# Patient Record
Sex: FEMALE | Race: BLACK OR AFRICAN AMERICAN | Employment: FULL TIME | URBAN - METROPOLITAN AREA
[De-identification: names, ages, dates, MRNs, and addresses within clinical notes are randomized per-mention and may not be internally consistent; named-entity substitution may affect disease eponyms.]

---

## 2019-08-12 ENCOUNTER — OFFICE VISIT (OUTPATIENT)
Dept: FAMILY MEDICINE CLINIC | Age: 54
End: 2019-08-12

## 2019-08-12 VITALS
HEART RATE: 54 BPM | OXYGEN SATURATION: 98 % | BODY MASS INDEX: 29.25 KG/M2 | RESPIRATION RATE: 19 BRPM | HEIGHT: 67 IN | DIASTOLIC BLOOD PRESSURE: 88 MMHG | SYSTOLIC BLOOD PRESSURE: 138 MMHG | TEMPERATURE: 97.5 F | WEIGHT: 186.38 LBS

## 2019-08-12 DIAGNOSIS — E66.3 OVERWEIGHT (BMI 25.0-29.9): ICD-10-CM

## 2019-08-12 DIAGNOSIS — R00.1 BRADYCARDIA: ICD-10-CM

## 2019-08-12 DIAGNOSIS — J30.89 NON-SEASONAL ALLERGIC RHINITIS, UNSPECIFIED TRIGGER: ICD-10-CM

## 2019-08-12 DIAGNOSIS — I10 ESSENTIAL HYPERTENSION: Primary | ICD-10-CM

## 2019-08-12 RX ORDER — METOPROLOL SUCCINATE 25 MG/1
TABLET, EXTENDED RELEASE ORAL
COMMUNITY
End: 2019-08-12 | Stop reason: SDUPTHER

## 2019-08-12 RX ORDER — LISINOPRIL 10 MG/1
10 TABLET ORAL DAILY
Qty: 90 TAB | Refills: 1 | Status: SHIPPED | OUTPATIENT
Start: 2019-08-12 | End: 2019-12-31 | Stop reason: SDUPTHER

## 2019-08-12 RX ORDER — METOPROLOL SUCCINATE 25 MG/1
25 TABLET, EXTENDED RELEASE ORAL DAILY
Qty: 90 TAB | Refills: 1 | Status: SHIPPED | OUTPATIENT
Start: 2019-08-12 | End: 2019-12-31 | Stop reason: SDUPTHER

## 2019-08-12 RX ORDER — CHLORTHALIDONE 25 MG/1
25 TABLET ORAL DAILY
Qty: 30 TAB | Refills: 1 | Status: SHIPPED | OUTPATIENT
Start: 2019-08-12 | End: 2019-12-15 | Stop reason: SDUPTHER

## 2019-08-12 RX ORDER — FLUTICASONE PROPIONATE 50 MCG
SPRAY, SUSPENSION (ML) NASAL AS NEEDED
COMMUNITY
End: 2021-07-26 | Stop reason: ALTCHOICE

## 2019-08-12 RX ORDER — LISINOPRIL 10 MG/1
TABLET ORAL DAILY
COMMUNITY
End: 2019-08-12 | Stop reason: SDUPTHER

## 2019-08-12 NOTE — PATIENT INSTRUCTIONS
Learning About High Blood Pressure What is high blood pressure? Blood pressure is a measure of how hard the blood pushes against the walls of your arteries. It's normal for blood pressure to go up and down throughout the day, but if it stays up, you have high blood pressure. Another name for high blood pressure is hypertension. Two numbers tell you your blood pressure. The first number is the systolic pressure. It shows how hard the blood pushes when your heart is pumping. The second number is the diastolic pressure. It shows how hard the blood pushes between heartbeats, when your heart is relaxed and filling with blood. Your doctor will give you a goal for your blood pressure. Your goal will be based on your health and your age. High blood pressure (hypertension) means that the top number stays high, or the bottom number stays high, or both. High blood pressure increases the risk of stroke, heart attack, and other problems. You and your doctor will talk about your risks of these problems based on your blood pressure. What happens when you have high blood pressure? · Blood flows through your arteries with too much force. Over time, this damages the walls of your arteries. But you can't feel it. High blood pressure usually doesn't cause symptoms. · Fat and calcium start to build up in your arteries. This buildup is called plaque. Plaque makes your arteries narrower and stiffer. Blood can't flow through them as easily. · This lack of good blood flow starts to damage some of the organs in your body. This can lead to problems such as coronary artery disease and heart attack, heart failure, stroke, kidney failure, and eye damage. How can you prevent high blood pressure? · Stay at a healthy weight. · Try to limit how much sodium you eat to less than 2,300 milligrams (mg) a day. If you limit your sodium to 1,500 mg a day, you can lower your blood pressure even more. ? Buy foods that are labeled \"unsalted,\" \"sodium-free,\" or \"low-sodium. \" Foods labeled \"reduced-sodium\" and \"light sodium\" may still have too much sodium. ? Flavor your food with garlic, lemon juice, onion, vinegar, herbs, and spices instead of salt. Do not use soy sauce, steak sauce, onion salt, garlic salt, mustard, or ketchup on your food. ? Use less salt (or none) when recipes call for it. You can often use half the salt a recipe calls for without losing flavor. · Be physically active. Get at least 30 minutes of exercise on most days of the week. Walking is a good choice. You also may want to do other activities, such as running, swimming, cycling, or playing tennis or team sports. · Limit alcohol to 2 drinks a day for men and 1 drink a day for women. · Eat plenty of fruits, vegetables, and low-fat dairy products. Eat less saturated and total fats. How is high blood pressure treated? · Your doctor will suggest making lifestyle changes to help your heart. For example, your doctor may ask you to eat healthy foods, quit smoking, lose extra weight, and be more active. · If lifestyle changes don't help enough, your doctor may recommend that you take medicine. · When blood pressure is very high, medicines are needed to lower it. Follow-up care is a key part of your treatment and safety. Be sure to make and go to all appointments, and call your doctor if you are having problems. It's also a good idea to know your test results and keep a list of the medicines you take. Where can you learn more? Go to http://vianney-yani.info/. Enter P501 in the search box to learn more about \"Learning About High Blood Pressure. \" Current as of: July 22, 2018 Content Version: 12.1 © 6309-8926 Healthwise, Incorporated. Care instructions adapted under license by [employer] (which disclaims liability or warranty for this information).  If you have questions about a medical condition or this instruction, always ask your healthcare professional. [address] any warranty or liability for your use of this information. DASH Diet: Care Instructions Your Care Instructions The DASH diet is an eating plan that can help lower your blood pressure. DASH stands for Dietary Approaches to Stop Hypertension. Hypertension is high blood pressure. The DASH diet focuses on eating foods that are high in calcium, potassium, and magnesium. These nutrients can lower blood pressure. The foods that are highest in these nutrients are fruits, vegetables, low-fat dairy products, nuts, seeds, and legumes. But taking calcium, potassium, and magnesium supplements instead of eating foods that are high in those nutrients does not have the same effect. The DASH diet also includes whole grains, fish, and poultry. The DASH diet is one of several lifestyle changes your doctor may recommend to lower your high blood pressure. Your doctor may also want you to decrease the amount of sodium in your diet. Lowering sodium while following the DASH diet can lower blood pressure even further than just the DASH diet alone. Follow-up care is a key part of your treatment and safety. Be sure to make and go to all appointments, and call your doctor if you are having problems. It's also a good idea to know your test results and keep a list of the medicines you take. How can you care for yourself at home? Following the DASH diet · Eat 4 to 5 servings of fruit each day. A serving is 1 medium-sized piece of fruit, ½ cup chopped or canned fruit, 1/4 cup dried fruit, or 4 ounces (½ cup) of fruit juice. Choose fruit more often than fruit juice. · Eat 4 to 5 servings of vegetables each day. A serving is 1 cup of lettuce or raw leafy vegetables, ½ cup of chopped or cooked vegetables, or 4 ounces (½ cup) of vegetable juice. Choose vegetables more often than vegetable juice. · Get 2 to 3 servings of low-fat and fat-free dairy each day. A serving is 8 ounces of milk, 1 cup of yogurt, or 1 ½ ounces of cheese. · Eat 6 to 8 servings of grains each day. A serving is 1 slice of bread, 1 ounce of dry cereal, or ½ cup of cooked rice, pasta, or cooked cereal. Try to choose whole-grain products as much as possible. · Limit lean meat, poultry, and fish to 2 servings each day. A serving is 3 ounces, about the size of a deck of cards. · Eat 4 to 5 servings of nuts, seeds, and legumes (cooked dried beans, lentils, and split peas) each week. A serving is 1/3 cup of nuts, 2 tablespoons of seeds, or ½ cup of cooked beans or peas. · Limit fats and oils to 2 to 3 servings each day. A serving is 1 teaspoon of vegetable oil or 2 tablespoons of salad dressing. · Limit sweets and added sugars to 5 servings or less a week. A serving is 1 tablespoon jelly or jam, ½ cup sorbet, or 1 cup of lemonade. · Eat less than 2,300 milligrams (mg) of sodium a day. If you limit your sodium to 1,500 mg a day, you can lower your blood pressure even more. Tips for success · Start small. Do not try to make dramatic changes to your diet all at once. You might feel that you are missing out on your favorite foods and then be more likely to not follow the plan. Make small changes, and stick with them. Once those changes become habit, add a few more changes. · Try some of the following: ? Make it a goal to eat a fruit or vegetable at every meal and at snacks. This will make it easy to get the recommended amount of fruits and vegetables each day. ? Try yogurt topped with fruit and nuts for a snack or healthy dessert. ? Add lettuce, tomato, cucumber, and onion to sandwiches. ? Combine a ready-made pizza crust with low-fat mozzarella cheese and lots of vegetable toppings. Try using tomatoes, squash, spinach, broccoli, carrots, cauliflower, and onions. ? Have a variety of cut-up vegetables with a low-fat dip as an appetizer instead of chips and dip. ? Sprinkle sunflower seeds or chopped almonds over salads. Or try adding chopped walnuts or almonds to cooked vegetables. ? Try some vegetarian meals using beans and peas. Add garbanzo or kidney beans to salads. Make burritos and tacos with mashed lozano beans or black beans. Where can you learn more? Go to http://vianney-yani.info/. Enter F541 in the search box to learn more about \"DASH Diet: Care Instructions. \" Current as of: July 22, 2018 Content Version: 12.1 © 3068-5746 Healthwise, Incorporated. Care instructions adapted under license by [employer] (which disclaims liability or warranty for this information). If you have questions about a medical condition or this instruction, always ask your healthcare professional. [place] [address] any warranty or liability for your use of this information.

## 2019-08-12 NOTE — PROGRESS NOTES
1. Have you been to the ER, urgent care clinic since your last visit? Hospitalized since your last visit? No   2. Have you seen or consulted any other health care providers outside of the [address] [place] [address] since your last visit? Include any pap smears or colon screening. No   Chief Complaint   Patient [place]   [facility] [place]     Patient here to establish care with a PCP. Patient requesting refills on her medications.

## 2019-08-12 NOTE — PROGRESS NOTES
Subjective:     Holly Vieyra is a 46 y.o. female who presents to [facility] care and for evaluation of hypertension. Longstanding hx of hypertension, currently taking lisinopril 10 mg, toprol xl 25 mg, and hctz 25 mg  Daily. Diet and Lifestyle: generally follows a low fat low cholesterol diet, generally follows a low sodium diet, exercises sporadically, nonsmoker  Home BP Monitoring: is borderline controlled at home, ranging 130-140's/80's    Cardiovascular ROS: taking medications as instructed, no medication side effects noted, no TIA's, no chest pain on exertion, no dyspnea on exertion, no orthostatic dizziness or lightheadedness, no orthopnea or paroxysmal nocturnal dyspnea, no palpitations, no intermittent claudication symptoms. Notes occasional swelling in right ankle after long periods of driving or riding in the car. New concerns: none. There is no problem list on file for this patient. Allergies   Allergen Reactions    Sulfa (Sulfonamide Antibiotics) Shortness of Breath     Past Medical History:   Diagnosis Date    Hypertension      History reviewed. No pertinent surgical history. Family History   Problem Relation Age of Onset    Stroke Mother     Heart Failure Mother     Cancer Mother     Gout Father     Heart Attack Sister     Diabetes Sister     Hypertension Sister      Social History     Tobacco Use    Smoking status: Never Smoker    Smokeless tobacco: Never Used   Substance Use Topics    Alcohol use: Yes          Review of Systems, additional:  A comprehensive review of systems was negative except for: Neurological: positive for headaches, has noted some mild pressure behind eyes and over temples in the past week intermittently, but has been under a lot of stress due to 's new dx of diabetes.     Objective:     Visit Vitals  /88 (BP 1 Location: Left arm, BP Patient Position: Sitting)   Pulse (!) 54   Temp 97.5 °F (36.4 °C) (Oral)   Resp 19   Ht 5' 7\" (1.702 m)   Wt 186 lb 6 oz (84.5 kg)   SpO2 98%   BMI 29.19 kg/m²     Physical Exam   Constitutional: She is oriented to person, place, and time and well-developed, well-nourished, and in no distress. No distress. HENT:   Head: Normocephalic and atraumatic. Eyes: Conjunctivae are normal. No scleral icterus. Neck: Normal range of motion. Neck supple. No JVD present. No tracheal deviation present. No thyromegaly present. Cardiovascular: Normal rate, regular rhythm, normal heart sounds and intact distal pulses. Exam reveals no gallop and no friction rub. No murmur heard. Normal carotid upstrokes bilaterally without bruit. No pedal/ankle edema   Pulmonary/Chest: Effort normal and breath sounds normal. She has no wheezes. She has no rales. Abdominal: Soft. Bowel sounds are normal. She exhibits no distension and no mass. There is no tenderness. Musculoskeletal: She exhibits no tenderness or deformity. Lymphadenopathy:     She has no cervical adenopathy. Neurological: She is alert and oriented to person, place, and time. She exhibits normal muscle tone. Gait normal. Coordination normal.   Skin: Skin is warm and dry. No rash noted. She is not diaphoretic. Psychiatric: Mood, memory, affect and judgment normal.         Assessment/Plan:       reviewed diet, exercise and weight control  copy of written low fat low cholesterol diet provided and reviewed  cardiovascular risk and specific lipid/LDL goals reviewed  reviewed medications and side effects in detail  reviewed potential future medication changes and side effects. Diagnoses and all orders for this visit:    1. Essential hypertension  Near goal  Continue toprol, prinivil  Discontinue hctz  Add chlorthalidone  DASH diet  Weight loss  150 minutes of moderate intensity exercise weekly  -     lisinopril (PRINIVIL, ZESTRIL) 10 mg tablet; Take 1 Tab by mouth daily. -     metoprolol succinate (TOPROL-XL) 25 mg XL tablet; Take 1 Tab by mouth daily.   - chlorthalidone (HYGROTEN) 25 mg tablet; Take 1 Tab by mouth daily.  -     LIPID PANEL  -     METABOLIC PANEL, COMPREHENSIVE  -     TSH 3RD GENERATION  -     CBC W/O DIFF    2. Bradycardia  Due to beta blocker, increase in dose not recommended    3. Overweight (BMI 25.0-29. 9)  I have reviewed/discussed the above normal BMI with the patient. I have recommended the following interventions: dietary management education, guidance, and counseling, encourage exercise and monitor weight . .      4. Non-seasonal allergic rhinitis, unspecified trigger  Stable  Continue flonase    Follow-up and Dispositions    · Return in about 1 month (around 9/9/2019) for f/u BP. I have discussed the diagnosis with the patient and the intended plan as seen in the above orders. The patient has received an after-visit summary and questions were answered concerning future plans. Patient conveyed understanding of the plan at the time of the visit.     Ashley Goldman NP  08/12/19

## 2019-08-13 LAB
ALBUMIN SERPL-MCNC: 4.2 G/DL (ref 3.5–5.5)
ALBUMIN/GLOB SERPL: 1.7 {RATIO} (ref 1.2–2.2)
ALP SERPL-CCNC: 51 IU/L (ref 39–117)
ALT SERPL-CCNC: 14 IU/L (ref 0–32)
AST SERPL-CCNC: 20 IU/L (ref 0–40)
BILIRUB SERPL-MCNC: 0.5 MG/DL (ref 0–1.2)
BUN SERPL-MCNC: 13 MG/DL (ref 6–24)
BUN/CREAT SERPL: 18 (ref 9–23)
CALCIUM SERPL-MCNC: 9.2 MG/DL (ref 8.7–10.2)
CHLORIDE SERPL-SCNC: 104 MMOL/L (ref 96–106)
CHOLEST SERPL-MCNC: 173 MG/DL (ref 100–199)
CO2 SERPL-SCNC: 27 MMOL/L (ref 20–29)
CREAT SERPL-MCNC: 0.73 MG/DL (ref 0.57–1)
ERYTHROCYTE [DISTWIDTH] IN BLOOD BY AUTOMATED COUNT: 14.2 % (ref 12.3–15.4)
GLOBULIN SER CALC-MCNC: 2.5 G/DL (ref 1.5–4.5)
GLUCOSE SERPL-MCNC: 89 MG/DL (ref 65–99)
HCT VFR BLD AUTO: 39.7 % (ref 34–46.6)
HDLC SERPL-MCNC: 55 MG/DL
HGB BLD-MCNC: 12.7 G/DL (ref 11.1–15.9)
INTERPRETATION, 910389: NORMAL
LDLC SERPL CALC-MCNC: 102 MG/DL (ref 0–99)
MCH RBC QN AUTO: 27.7 PG (ref 26.6–33)
MCHC RBC AUTO-ENTMCNC: 32 G/DL (ref 31.5–35.7)
MCV RBC AUTO: 87 FL (ref 79–97)
PLATELET # BLD AUTO: 193 X10E3/UL (ref 150–450)
POTASSIUM SERPL-SCNC: 4 MMOL/L (ref 3.5–5.2)
PROT SERPL-MCNC: 6.7 G/DL (ref 6–8.5)
RBC # BLD AUTO: 4.59 X10E6/UL (ref 3.77–5.28)
SODIUM SERPL-SCNC: 144 MMOL/L (ref 134–144)
TRIGL SERPL-MCNC: 79 MG/DL (ref 0–149)
TSH SERPL DL<=0.005 MIU/L-ACNC: 0.71 UIU/ML (ref 0.45–4.5)
VLDLC SERPL CALC-MCNC: 16 MG/DL (ref 5–40)
WBC # BLD AUTO: 5.2 X10E3/UL (ref 3.4–10.8)

## 2019-08-19 NOTE — PROGRESS NOTES
LDL just above goal- recommend cutting back calories from saturated fat in the diet. All other labs are normal.  Repeat in 12 months.

## 2019-09-13 ENCOUNTER — OFFICE VISIT (OUTPATIENT)
Dept: FAMILY MEDICINE CLINIC | Age: 54
End: 2019-09-13

## 2019-09-13 VITALS
TEMPERATURE: 97.5 F | HEIGHT: 67 IN | SYSTOLIC BLOOD PRESSURE: 105 MMHG | BODY MASS INDEX: 29.1 KG/M2 | DIASTOLIC BLOOD PRESSURE: 72 MMHG | HEART RATE: 66 BPM | RESPIRATION RATE: 14 BRPM | OXYGEN SATURATION: 97 % | WEIGHT: 185.4 LBS

## 2019-09-13 DIAGNOSIS — I10 ESSENTIAL HYPERTENSION: Primary | ICD-10-CM

## 2019-09-13 DIAGNOSIS — J30.89 NON-SEASONAL ALLERGIC RHINITIS, UNSPECIFIED TRIGGER: ICD-10-CM

## 2019-09-13 DIAGNOSIS — Z12.39 SCREENING FOR MALIGNANT NEOPLASM OF BREAST: ICD-10-CM

## 2019-09-13 NOTE — PROGRESS NOTES
Jose Jeffrey is a 46 y.o. female    Chief Complaint   Patient presents with    Hypertension    Follow-up     1. Have you been to the ER, urgent care clinic since your last visit? Hospitalized since your last visit? No    2. Have you seen or consulted any other health care providers outside of the [address] [place] [address] since your last visit? Include any pap smears or colon screening.  No

## 2019-09-13 NOTE — PATIENT INSTRUCTIONS
Hold the diuretic (chlorthalidone) for now, continue your other meds. Take your blood pressure every day, record on log, send measurements to me via [employer]. Once we see the effect not taking the chlorthalidone has on your blood pressure, we can make a good decision about what to do to keep your blood pressure controlled. High Blood Pressure: Care Instructions  Overview    It's normal for blood pressure to go up and down throughout the day. But if it stays up, you have high blood pressure. Another name for high blood pressure is hypertension. Despite what a lot of people think, high blood pressure usually doesn't cause headaches or make you feel dizzy or lightheaded. It usually has no symptoms. But it does increase your risk of stroke, heart attack, and other problems. You and your doctor will talk about your risks of these problems based on your blood pressure. Your doctor will give you a goal for your blood pressure. Your goal will be based on your health and your age. Lifestyle changes, such as eating healthy and being active, are always important to help lower blood pressure. You might also take medicine to reach your blood pressure goal.  Follow-up care is a key part of your treatment and safety. Be sure to make and go to all appointments, and call your doctor if you are having problems. It's also a good idea to know your test results and keep a list of the medicines you take. How can you care for yourself at home? Medical treatment  · If you stop taking your medicine, your blood pressure will go back up. You may take one or more types of medicine to lower your blood pressure. Be safe with medicines. Take your medicine exactly as prescribed. Call your doctor if you think you are having a problem with your medicine. · Talk to your doctor before you start taking aspirin every day. Aspirin can help certain people lower their risk of a heart attack or stroke.  But taking aspirin isn't right for everyone, because it can cause serious bleeding. · See your doctor regularly. You may need to see the doctor more often at first or until your blood pressure comes down. · If you are taking blood pressure medicine, talk to your doctor before you take decongestants or anti-inflammatory medicine, such as ibuprofen. Some of these medicines can raise blood pressure. · Learn how to check your blood pressure at home. Lifestyle changes  · Stay at a healthy weight. This is especially important if you put on weight around the waist. Losing even 10 pounds can help you lower your blood pressure. · If your doctor recommends it, get more exercise. Walking is a good choice. Bit by bit, increase the amount you walk every day. Try for at least 30 minutes on most days of the week. You also may want to swim, bike, or do other activities. · Avoid or limit alcohol. Talk to your doctor about whether you can drink any alcohol. · Try to limit how much sodium you eat to less than 2,300 milligrams (mg) a day. Your doctor may ask you to try to eat less than 1,500 mg a day. · Eat plenty of fruits (such as bananas and oranges), vegetables, legumes, whole grains, and low-fat dairy products. · Lower the amount of saturated fat in your diet. Saturated fat is found in animal products such as milk, cheese, and meat. Limiting these foods may help you lose weight and also lower your risk for heart disease. · Do not smoke. Smoking increases your risk for heart attack and stroke. If you need help quitting, talk to your doctor about stop-smoking programs and medicines. These can increase your chances of quitting for good. When should you call for help? Call 911 anytime you think you may need emergency care. This may mean having symptoms that suggest that your blood pressure is causing a serious heart or blood vessel problem. Your blood pressure may be over 180/120.   For example, call 911 if:    · You have symptoms of a heart attack.  These may include:  ? Chest pain or pressure, or a strange feeling in the chest.  ? Sweating. ? Shortness of breath. ? Nausea or vomiting. ? Pain, pressure, or a strange feeling in the back, neck, jaw, or upper belly or in one or both shoulders or arms. ? Lightheadedness or sudden weakness. ? A fast or irregular heartbeat.     · You have symptoms of a stroke. These may include:  ? Sudden numbness, tingling, weakness, or loss of movement in your face, arm, or leg, especially on only one side of your body. ? Sudden vision changes. ? Sudden trouble speaking. ? Sudden confusion or trouble understanding simple statements. ? Sudden problems with walking or balance. ? A sudden, severe headache that is different from past headaches.     · You have severe back or belly pain.    Do not wait until your blood pressure comes down on its own. Get help right away.   Call your doctor now or seek immediate care if:    · Your blood pressure is much higher than normal (such as 180/120 or higher), but you don't have symptoms.     · You think high blood pressure is causing symptoms, such as:  ? Severe headache.  ? Blurry vision.    Watch closely for changes in your health, and be sure to contact your doctor if:    · Your blood pressure measures higher than your doctor recommends at least 2 times. That means the top number is higher or the bottom number is higher, or both.     · You think you may be having side effects from your blood pressure medicine. Where can you learn more? Go to http://vianney-yani.info/. Enter Z279 in the search box to learn more about \"High Blood Pressure: Care Instructions. \"  Current as of: July 22, 2018  Content Version: 12.1  © 3819-4392 [employer]. Care instructions adapted under license by [employer] (which disclaims liability or warranty for this information).  If you have questions about a medical condition or this instruction, always ask your healthcare professional. [address] any warranty or liability for your use of this information.

## 2019-10-14 NOTE — PROGRESS NOTES
Subjective:     Nicole Pike is a 53 y.o. female who presents for follow up of hypertension and allergic rhinitis. Diet and Lifestyle: generally follows a low fat low cholesterol diet, generally follows a low sodium diet, exercises sporadically, nonsmoker  Home BP Monitoring: is not measured at home    Cardiovascular ROS: taking medications as instructed, no medication side effects noted, no TIA's, no chest pain on exertion, no dyspnea on exertion, no swelling of ankles, no orthostatic dizziness or lightheadedness, no orthopnea or paroxysmal nocturnal dyspnea, no palpitations, no intermittent claudication symptoms. New concerns: c/o nasal congestion, clear rhinorrhea, dry cough x 1-2 weeks. No fever or chills. No sputum production. Patient Active Problem List   Diagnosis Code    Essential hypertension I10    Non-seasonal allergic rhinitis J30.89    Overweight (BMI 25.0-29. 9) E66.3     Allergies   Allergen Reactions    Sulfa (Sulfonamide Antibiotics) Shortness of Breath     Past Medical History:   Diagnosis Date    Hypertension      History reviewed. No pertinent surgical history.   Family History   Problem Relation Age of Onset    Stroke Mother     Heart Failure Mother     Cancer Mother     Gout Father     Heart Attack Sister     Diabetes Sister     Hypertension Sister      Social History     Tobacco Use    Smoking status: Never Smoker    Smokeless tobacco: Never Used   Substance Use Topics    Alcohol use: Yes        Lab Results   Component Value Date/Time    WBC 5.2 08/12/2019 09:32 AM    HGB 12.7 08/12/2019 09:32 AM    HCT 39.7 08/12/2019 09:32 AM    PLATELET 806 30/32/5629 09:32 AM    MCV 87 08/12/2019 09:32 AM     Lab Results   Component Value Date/Time    Glucose 89 08/12/2019 09:32 AM    LDL, calculated 102 (H) 08/12/2019 09:32 AM    Creatinine 0.73 08/12/2019 09:32 AM      Lab Results   Component Value Date/Time    Cholesterol, total 173 08/12/2019 09:32 AM    HDL Cholesterol 55 08/12/2019 09:32 AM    LDL, calculated 102 (H) 08/12/2019 09:32 AM    Triglyceride 79 08/12/2019 09:32 AM     Lab Results   Component Value Date/Time    ALT (SGPT) 14 08/12/2019 09:32 AM    AST (SGOT) 20 08/12/2019 09:32 AM    Alk. phosphatase 51 08/12/2019 09:32 AM    Bilirubin, total 0.5 08/12/2019 09:32 AM    Albumin 4.2 08/12/2019 09:32 AM    Protein, total 6.7 08/12/2019 09:32 AM    PLATELET 386 07/18/5718 09:32 AM     Lab Results   Component Value Date/Time    GFR est non-AA 94 08/12/2019 09:32 AM    GFR est  08/12/2019 09:32 AM    Creatinine 0.73 08/12/2019 09:32 AM    BUN 13 08/12/2019 09:32 AM    Sodium 144 08/12/2019 09:32 AM    Potassium 4.0 08/12/2019 09:32 AM    Chloride 104 08/12/2019 09:32 AM    CO2 27 08/12/2019 09:32 AM     Lab Results   Component Value Date/Time    TSH 0.713 08/12/2019 09:32 AM         Review of Systems, additional:  A comprehensive review of systems was negative except for that written in the HPI. Objective:     Visit Vitals  /72 (BP 1 Location: Right arm, BP Patient Position: Sitting)   Pulse 66   Temp 97.5 °F (36.4 °C) (Oral)   Resp 14   Ht 5' 7\" (1.702 m)   Wt 185 lb 6.4 oz (84.1 kg)   SpO2 97%   BMI 29.04 kg/m²     Physical Exam   Constitutional: She is oriented to person, place, and time and well-developed, well-nourished, and in no distress. No distress. HENT:   Head: Normocephalic and atraumatic. Right Ear: Hearing, tympanic membrane, external ear and ear canal normal.   Left Ear: Hearing, tympanic membrane, external ear and ear canal normal.   Nose: Nose normal.   Mouth/Throat: Oropharynx is clear and moist.   Eyes: Conjunctivae are normal. Right eye exhibits no discharge. Left eye exhibits no discharge. No scleral icterus. Neck: Normal range of motion. Neck supple. No JVD present. No tracheal deviation present. No thyromegaly present. Cardiovascular: Normal rate, regular rhythm, normal heart sounds and intact distal pulses.  Exam reveals no gallop and no friction rub. No murmur heard. Normal carotid upstrokes without bruit bilaterally. No pedal/ankle edema. Pulmonary/Chest: Effort normal and breath sounds normal. She has no wheezes. She has no rales. Abdominal: Soft. Bowel sounds are normal. She exhibits no distension and no mass. There is no tenderness. Lymphadenopathy:     She has no cervical adenopathy. Neurological: She is alert and oriented to person, place, and time. She exhibits normal muscle tone. Gait normal. Coordination normal.   Skin: Skin is warm and dry. No rash noted. She is not diaphoretic. Psychiatric: Mood, memory, affect and judgment normal.         Assessment/Plan:     reviewed diet, exercise and weight control  copy of written low fat low cholesterol diet provided and reviewed  cardiovascular risk and specific lipid/LDL goals reviewed  reviewed medications and side effects in detail  reviewed potential future medication changes and side effects. Diagnoses and all orders for this visit:    1. Essential hypertension  At goal  Continue rx  DASH diet  Weight loss  Daily walking or other exercise  -     [facility] BP FLOWSHEET    2. Non-seasonal allergic rhinitis, unspecified trigger  restart flonase  Add antihistamine if symptoms not adequately controlled  Avoid decongestants due to HTN    3. Screening for malignant neoplasm of breast  -     [facility] BI SCREENING INCL CAD; Future      Follow-up and Dispositions    · Return in about 3 months (around 12/13/2019), or if symptoms worsen or fail to improve. I have discussed the diagnosis with the patient and the intended plan as seen in the above orders. The patient has received an after-visit summary and questions were answered concerning future plans. Patient conveyed understanding of the plan at the time of the visit.     Bipin King NP

## 2019-11-05 ENCOUNTER — HOSPITAL ENCOUNTER (OUTPATIENT)
Dept: MAMMOGRAPHY | Age: 54
Discharge: HOME | End: 2019-11-05
Attending: NURSE PRACTITIONER
Payer: COMMERCIAL

## 2019-11-05 DIAGNOSIS — Z12.39 SCREENING FOR MALIGNANT NEOPLASM OF BREAST: ICD-10-CM

## 2019-11-05 PROCEDURE — 77067 SCR MAMMO BI INCL CAD: CPT

## 2019-12-04 ENCOUNTER — TELEPHONE (OUTPATIENT)
Dept: FAMILY MEDICINE CLINIC | Age: 54
End: 2019-12-04

## 2019-12-04 NOTE — TELEPHONE ENCOUNTER
----- Message from Jennifer Rodriguez NP sent at 12/4/2019 10:24 AM EST -----  Negative mammogram, next screening recommended in 1 year.

## 2019-12-05 ENCOUNTER — TELEPHONE (OUTPATIENT)
Dept: FAMILY MEDICINE CLINIC | Age: 54
End: 2019-12-05

## 2019-12-05 NOTE — TELEPHONE ENCOUNTER
2nd attempted made to pt to inform results. Patient x2 id verified. Informed results to pt, pt verbalized understanding.

## 2019-12-05 NOTE — TELEPHONE ENCOUNTER
----- Message from Inderjit Cordero NP sent at 12/4/2019 10:24 AM EST -----  Negative mammogram, next screening recommended in 1 year.

## 2019-12-31 ENCOUNTER — OFFICE VISIT (OUTPATIENT)
Dept: FAMILY MEDICINE CLINIC | Age: 54
End: 2019-12-31

## 2019-12-31 VITALS
BODY MASS INDEX: 29.43 KG/M2 | OXYGEN SATURATION: 98 % | SYSTOLIC BLOOD PRESSURE: 127 MMHG | WEIGHT: 187.5 LBS | DIASTOLIC BLOOD PRESSURE: 84 MMHG | RESPIRATION RATE: 16 BRPM | HEART RATE: 73 BPM | HEIGHT: 67 IN | TEMPERATURE: 98.1 F

## 2019-12-31 DIAGNOSIS — K52.9 ACUTE GASTROENTERITIS: ICD-10-CM

## 2019-12-31 DIAGNOSIS — I10 ESSENTIAL HYPERTENSION: Primary | ICD-10-CM

## 2019-12-31 RX ORDER — METOPROLOL SUCCINATE 25 MG/1
25 TABLET, EXTENDED RELEASE ORAL DAILY
Qty: 90 TAB | Refills: 1 | Status: SHIPPED | OUTPATIENT
Start: 2019-12-31 | End: 2020-06-16 | Stop reason: SDUPTHER

## 2019-12-31 RX ORDER — LISINOPRIL 10 MG/1
10 TABLET ORAL DAILY
Qty: 90 TAB | Refills: 1 | Status: SHIPPED | OUTPATIENT
Start: 2019-12-31 | End: 2020-06-16 | Stop reason: SDUPTHER

## 2019-12-31 RX ORDER — CHLORTHALIDONE 25 MG/1
25 TABLET ORAL DAILY
Qty: 90 TAB | Refills: 1 | Status: SHIPPED | OUTPATIENT
Start: 2019-12-31 | End: 2020-06-16 | Stop reason: SDUPTHER

## 2019-12-31 NOTE — PATIENT INSTRUCTIONS
Gastroenteritis: Care Instructions  Your Care Instructions    Gastroenteritis is an illness that may cause nausea, vomiting, and diarrhea. It is sometimes called \"stomach flu. \" It can be caused by bacteria or a virus. You will probably begin to feel better in 1 to 2 days. In the meantime, get plenty of rest and make sure you do not become dehydrated. Dehydration occurs when your body loses too much fluid. Follow-up care is a key part of your treatment and safety. Be sure to make and go to all appointments, and call your doctor if you are having problems. It's also a good idea to know your test results and keep a list of the medicines you take. How can you care for yourself at home? · If your doctor prescribed antibiotics, take them as directed. Do not stop taking them just because you feel better. You need to take the full course of antibiotics. · Drink plenty of fluids to prevent dehydration, enough so that your urine is light yellow or clear like water. Choose water and other caffeine-free clear liquids until you feel better. If you have kidney, heart, or liver disease and have to limit fluids, talk with your doctor before you increase your fluid intake. · Drink fluids slowly, in frequent, small amounts, because drinking too much too fast can cause vomiting. · Begin eating mild foods, such as dry toast, yogurt, applesauce, bananas, and rice. Avoid spicy, hot, or high-fat foods, and do not drink alcohol or caffeine for a day or two. Do not drink milk or eat ice cream until you are feeling better. How to prevent gastroenteritis  · Keep hot foods hot and cold foods cold. · Do not eat meats, dressings, salads, or other foods that have been kept at room temperature for more than 2 hours. · Use a thermometer to check your refrigerator. It should be between 34°F and 40°F.  · Defrost meats in the refrigerator or microwave, not on the kitchen counter. · Keep your hands and your kitchen clean.  Wash your hands, cutting boards, and countertops with hot soapy water frequently. · Cook meat until it is well done. · Do not eat raw eggs or uncooked sauces made with raw eggs. · Do not take chances. If food looks or tastes spoiled, throw it out. When should you call for help? Call 911 anytime you think you may need emergency care. For example, call if:    · You vomit blood or what looks like coffee grounds.     · You passed out (lost consciousness).     · You pass maroon or very bloody stools.    Call your doctor now or seek immediate medical care if:    · You have severe belly pain.     · You have signs of needing more fluids. You have sunken eyes, a dry mouth, and pass only a little dark urine.     · You feel like you are going to faint.     · You have increased belly pain that does not go away in 1 to 2 days.     · You have new or increased nausea, or you are vomiting.     · You have a new or higher fever.     · Your stools are black and tarlike or have streaks of blood.    Watch closely for changes in your health, and be sure to contact your doctor if:    · You are dizzy or lightheaded.     · You urinate less than usual, or your urine is dark yellow or brown.     · You do not feel better with each day that goes by. Where can you learn more? Go to http://vianney-yani.info/. Enter N142 in the search box to learn more about \"Gastroenteritis: Care Instructions. \"  Current as of: June 9, 2019  Content Version: 12.2  © 6721-6804 [email or web address]. Care instructions adapted under license by [employer] (which disclaims liability or warranty for this information). If you have questions about a medical condition or this instruction, always ask your healthcare professional. [place] [address] any warranty or liability for your use of this information.          Oral Rehydration: Care Instructions  Your Care Instructions    Dehydration occurs when your body loses too much water. This can happen if you do not drink enough fluids or lose a lot of fluid due to diarrhea, vomiting, or sweating. Being dehydrated can cause health problems and can even be life-threatening. To replace lost fluids, you need to drink liquid that contains special chemicals called electrolytes. Electrolytes keep your body working well. Plain water does not have electrolytes. You also need to rest to prevent more fluid loss. Replacing water and electrolytes (oral rehydration) completely takes about 36 hours. But you should feel better within a few hours. Follow-up care is a key part of your treatment and safety. Be sure to make and go to all appointments, and call your doctor if you are having problems. It's also a good idea to know your test results and keep a list of the medicines you take. How can you care for yourself at home? · Take frequent sips of a drink such as Gatorade, Powerade, or other rehydration drinks that your doctor suggests. These replace both fluid and important chemicals (electrolytes) you need for balance in your blood. · Drink 2 quarts of cool liquid over 2 to 4 hours. You should have at least 10 glasses of liquid a day to replace lost fluid. If you have kidney, heart, or liver disease and have to limit fluids, talk with your doctor before you increase the amount of fluids you drink. · Make your own drink. Measure everything carefully. The drink may not work well or may even be harmful if the amounts are off. ? 1 quart water  ? ½ teaspoon salt  ? 6 teaspoons sugar  · Do not drink liquid with caffeine, such as coffee and ellis. · Do not drink any alcohol. It can make you dehydrated. · Drink plenty of fluids, enough so that your urine is light yellow or clear like water. If you have kidney, heart, or liver disease and have to limit fluids, talk with your doctor before you increase the amount of fluids you drink. When should you call for help?   Call 911 anytime you think you may need emergency care. For example, call if:    · You have signs of severe dehydration, such as:  ? You are confused or unable to stay awake.  ? You passed out (lost consciousness).    Call your doctor now or seek immediate medical care if:    · You still have signs of dehydration. You have sunken eyes and a dry mouth, and you pass only a little dark urine.     · You are dizzy or lightheaded, or you feel like you may faint.     · You are not able to keep down fluids.    Watch closely for changes in your health, and be sure to contact your doctor if:    · You do not get better as expected. Where can you learn more? Go to http://vianney-yani.info/. Enter I040 in the search box to learn more about \"Oral Rehydration: Care Instructions. \"  Current as of: June 26, 2019  Content Version: 12.2  © 7476-5468 [employer]. Care instructions adapted under license by [employer] (which disclaims liability or warranty for this information). If you have questions about a medical condition or this instruction, always ask your healthcare professional. [address] any warranty or liability for your use of this information. High Blood Pressure: Care Instructions  Overview    It's normal for blood pressure to go up and down throughout the day. But if it stays up, you have high blood pressure. Another name for high blood pressure is hypertension. Despite what a lot of people think, high blood pressure usually doesn't cause headaches or make you feel dizzy or lightheaded. It usually has no symptoms. But it does increase your risk of stroke, heart attack, and other problems. You and your doctor will talk about your risks of these problems based on your blood pressure. Your doctor will give you a goal for your blood pressure. Your goal will be based on your health and your age.   Lifestyle changes, such as eating healthy and being active, are always important to help lower blood pressure. You might also take medicine to reach your blood pressure goal.  Follow-up care is a key part of your treatment and safety. Be sure to make and go to all appointments, and call your doctor if you are having problems. It's also a good idea to know your test results and keep a list of the medicines you take. How can you care for yourself at home? Medical treatment  · If you stop taking your medicine, your blood pressure will go back up. You may take one or more types of medicine to lower your blood pressure. Be safe with medicines. Take your medicine exactly as prescribed. Call your doctor if you think you are having a problem with your medicine. · Talk to your doctor before you start taking aspirin every day. Aspirin can help certain people lower their risk of a heart attack or stroke. But taking aspirin isn't right for everyone, because it can cause serious bleeding. · See your doctor regularly. You may need to see the doctor more often at first or until your blood pressure comes down. · If you are taking blood pressure medicine, talk to your doctor before you take decongestants or anti-inflammatory medicine, such as ibuprofen. Some of these medicines can raise blood pressure. · Learn how to check your blood pressure at home. Lifestyle changes  · Stay at a healthy weight. This is especially important if you put on weight around the waist. Losing even 10 pounds can help you lower your blood pressure. · If your doctor recommends it, get more exercise. Walking is a good choice. Bit by bit, increase the amount you walk every day. Try for at least 30 minutes on most days of the week. You also may want to swim, bike, or do other activities. · Avoid or limit alcohol. Talk to your doctor about whether you can drink any alcohol. · Try to limit how much sodium you eat to less than 2,300 milligrams (mg) a day. Your doctor may ask you to try to eat less than 1,500 mg a day.   · Eat plenty of fruits (such as bananas and oranges), vegetables, legumes, whole grains, and low-fat dairy products. · Lower the amount of saturated fat in your diet. Saturated fat is found in animal products such as milk, cheese, and meat. Limiting these foods may help you lose weight and also lower your risk for heart disease. · Do not smoke. Smoking increases your risk for heart attack and stroke. If you need help quitting, talk to your doctor about stop-smoking programs and medicines. These can increase your chances of quitting for good. When should you call for help? Call  911 anytime you think you may need emergency care. This may mean having symptoms that suggest that your blood pressure is causing a serious heart or blood vessel problem. Your blood pressure may be over 180/120.   For example, call  911 if:    · You have symptoms of a heart attack. These may include:  ? Chest pain or pressure, or a strange feeling in the chest.  ? Sweating. ? Shortness of breath. ? Nausea or vomiting. ? Pain, pressure, or a strange feeling in the back, neck, jaw, or upper belly or in one or both shoulders or arms. ? Lightheadedness or sudden weakness. ? A fast or irregular heartbeat.     · You have symptoms of a stroke. These may include:  ? Sudden numbness, tingling, weakness, or loss of movement in your face, arm, or leg, especially on only one side of your body. ? Sudden vision changes. ? Sudden trouble speaking. ? Sudden confusion or trouble understanding simple statements. ? Sudden problems with walking or balance. ? A sudden, severe headache that is different from past headaches.     · You have severe back or belly pain.    Do not wait until your blood pressure comes down on its own.  Get help right away.   Call your doctor now or seek immediate care if:    · Your blood pressure is much higher than normal (such as 180/120 or higher), but you don't have symptoms.     · You think high blood pressure is causing symptoms, such as:  ? Severe headache.  ? Blurry vision.    Watch closely for changes in your health, and be sure to contact your doctor if:    · Your blood pressure measures higher than your doctor recommends at least 2 times. That means the top number is higher or the bottom number is higher, or both.     · You think you may be having side effects from your blood pressure medicine. Where can you learn more? Go to http://vianney-yani.info/. Enter O208 in the search box to learn more about \"High Blood Pressure: Care Instructions. \"  Current as of: April 9, 2019  Content Version: 12.2  © 7124-4588 [employer]. Care instructions adapted under license by [employer] (which disclaims liability or warranty for this information). If you have questions about a medical condition or this instruction, always ask your healthcare professional. [address] any warranty or liability for your use of this information.

## 2019-12-31 NOTE — PROGRESS NOTES
Subjective:     Nicole Henry is a 53 y.o. female who presents for follow up of hypertension and evaluation of vomiting, diarrhea, and decreased appetite. Diet and Lifestyle: generally follows a low fat low cholesterol diet, generally follows a low sodium diet, sedentary, nonsmoker  Home BP Monitoring: has been variable at home, high before alana but low 2 days ago    Cardiovascular ROS: taking medications as instructed, no medication side effects noted, no TIA's, no chest pain on exertion, no dyspnea on exertion, no swelling of ankles, no orthostatic dizziness or lightheadedness, no orthopnea or paroxysmal nocturnal dyspnea, no palpitations, no intermittent claudication symptoms. New concerns: c/o decreased appetite for the past few days, began after acute GI illness over the weekend with diarrhea, nausea and vomiting. Diarrhea and vomiting now resolved, keeping fluids down but has no desire to eat. Patient Active Problem List   Diagnosis Code    Essential hypertension I10    Non-seasonal allergic rhinitis J30.89    Overweight (BMI 25.0-29. 9) E66.3     Allergies   Allergen Reactions    Sulfa (Sulfonamide Antibiotics) Shortness of Breath     Past Medical History:   Diagnosis Date    Hypertension      Past Surgical History:   Procedure Laterality Date    HX BREAST BIOPSY Left     benign    HX BREAST LUMPECTOMY Left     benign     Family History   Problem Relation Age of Onset    Stroke Mother     Heart Failure Mother     Cancer Mother     Gout Father     Heart Attack Sister     Diabetes Sister     Hypertension Sister     Breast Cancer Paternal Aunt      Social History     Tobacco Use    Smoking status: Never Smoker    Smokeless tobacco: Never Used   Substance Use Topics    Alcohol use: Yes        Lab Results   Component Value Date/Time    WBC 5.2 08/12/2019 09:32 AM    HGB 12.7 08/12/2019 09:32 AM    HCT 39.7 08/12/2019 09:32 AM    PLATELET 372 11/78/2348 09:32 AM    MCV 87 08/12/2019 09:32 AM     Lab Results   Component Value Date/Time    Glucose 89 08/12/2019 09:32 AM    LDL, calculated 102 (H) 08/12/2019 09:32 AM    Creatinine 0.73 08/12/2019 09:32 AM      Lab Results   Component Value Date/Time    Cholesterol, total 173 08/12/2019 09:32 AM    HDL Cholesterol 55 08/12/2019 09:32 AM    LDL, calculated 102 (H) 08/12/2019 09:32 AM    Triglyceride 79 08/12/2019 09:32 AM     Lab Results   Component Value Date/Time    ALT (SGPT) 14 08/12/2019 09:32 AM    AST (SGOT) 20 08/12/2019 09:32 AM    Alk. phosphatase 51 08/12/2019 09:32 AM    Bilirubin, total 0.5 08/12/2019 09:32 AM    Albumin 4.2 08/12/2019 09:32 AM    Protein, total 6.7 08/12/2019 09:32 AM    PLATELET 599 41/67/3393 09:32 AM     Lab Results   Component Value Date/Time    GFR est non-AA 94 08/12/2019 09:32 AM    GFR est  08/12/2019 09:32 AM    Creatinine 0.73 08/12/2019 09:32 AM    BUN 13 08/12/2019 09:32 AM    Sodium 144 08/12/2019 09:32 AM    Potassium 4.0 08/12/2019 09:32 AM    Chloride 104 08/12/2019 09:32 AM    CO2 27 08/12/2019 09:32 AM     Lab Results   Component Value Date/Time    TSH 0.713 08/12/2019 09:32 AM         Review of Systems, additional:  A comprehensive review of systems was negative except for that written in the HPI.     Objective:     Visit Vitals  /84 (BP 1 Location: Right arm, BP Patient Position: Sitting)   Pulse 73   Temp 98.1 °F (36.7 °C) (Oral)   Resp 16   Ht 5' 7\" (1.702 m)   Wt 187 lb 8 oz (85 kg)   SpO2 98%   BMI 29.37 kg/m²     Physical Examination: General appearance - alert, well appearing, and in no distress, oriented to person, place, and time   Mental status - normal mood, behavior, speech, dress, motor activity, and thought processes  Eyes - sclera anicteric  Neck - supple, no significant adenopathy, thyroid exam: thyroid is normal in size without nodules or tenderness  Chest - clear to auscultation, no wheezes, rales or rhonchi, symmetric air entry  Heart - normal rate, regular rhythm, normal S1, S2, no murmurs, rubs, clicks or gallops, normal bilateral carotid upstroke without bruits, no JVD  Abdomen - soft, nontender, nondistended, no masses or organomegaly  bowel sounds normal  Neurological - alert, oriented, normal speech, no focal findings or movement disorder noted  Musculoskeletal - no joint tenderness, deformity or swelling, no muscular tenderness noted  Extremities - no pedal edema noted, intact peripheral pulses  Skin - normal coloration and turgor, no rashes, no suspicious skin lesions noted      Assessment/Plan:     reviewed diet, exercise and weight control  copy of written low fat low cholesterol diet provided and reviewed  cardiovascular risk and specific lipid/LDL goals reviewed  reviewed medications and side effects in detail  reviewed potential future medication changes and side effects. Diagnoses and all orders for this visit:    1. Essential hypertension  at goal, but reports variability at home  Continue current rx  DASH diet  Weight loss  150 minutes of moderate intensity exercise weekly  Monitor bp, send measurements weekly via [employer]  -     [facility]  -     chlorthalidone (HYGROTEN) 25 mg tablet; Take 1 Tab by mouth daily. -     lisinopril (PRINIVIL, ZESTRIL) 10 mg tablet; Take 1 Tab by mouth daily. -     metoprolol succinate (TOPROL-XL) 25 mg XL tablet; Take 1 Tab by mouth daily. 2. Acute gastroenteritis  Resolving  Push fluids  Saunders diet, advance as tolerated     update:  Declines flu, tdap vaccines  Reports normal colonoscopy in [place] 5+ years ago, will obtain report    Follow-up and Dispositions    · Return in about 6 months (around 6/30/2020), or if symptoms worsen or fail to improve. I have discussed the diagnosis with the patient and the intended plan as seen in the above orders. The patient has received an after-visit summary and questions were answered concerning future plans.  Patient conveyed understanding of the plan at the time of the visit.     Geraldo Platt NP  12/31/19

## 2020-06-16 ENCOUNTER — VIRTUAL VISIT (OUTPATIENT)
Dept: FAMILY MEDICINE CLINIC | Age: 55
End: 2020-06-16

## 2020-06-16 DIAGNOSIS — I10 ESSENTIAL HYPERTENSION: Primary | ICD-10-CM

## 2020-06-16 DIAGNOSIS — E78.00 ELEVATED LDL CHOLESTEROL LEVEL: ICD-10-CM

## 2020-06-16 RX ORDER — METOPROLOL SUCCINATE 25 MG/1
25 TABLET, EXTENDED RELEASE ORAL DAILY
Qty: 90 TAB | Refills: 1 | Status: SHIPPED | OUTPATIENT
Start: 2020-06-16 | End: 2020-12-09 | Stop reason: SDUPTHER

## 2020-06-16 RX ORDER — LISINOPRIL 10 MG/1
10 TABLET ORAL DAILY
Qty: 90 TAB | Refills: 1 | Status: SHIPPED | OUTPATIENT
Start: 2020-06-16 | End: 2020-12-09 | Stop reason: SDUPTHER

## 2020-06-16 RX ORDER — CHLORTHALIDONE 25 MG/1
25 TABLET ORAL DAILY
Qty: 90 TAB | Refills: 1 | Status: SHIPPED | OUTPATIENT
Start: 2020-06-16 | End: 2020-12-09 | Stop reason: SDUPTHER

## 2020-06-16 NOTE — PROGRESS NOTES
Keisha Craven is a 53 y.o. female who was seen by synchronous (real-time) audio-video technology on 6/16/2020. Consent: Keisha Craven, who was seen by synchronous (real-time) audio-video technology, and/or her healthcare decision maker, is aware that this patient-initiated, Telehealth encounter on 6/16/2020 is a billable service, with coverage as determined by her insurance carrier. She is aware that she may receive a bill and has provided verbal consent to proceed: Yes. Assessment & Plan:   Diagnoses and all orders for this visit:    1. Essential hypertension  at goal  Continue lisinopril, toprol  Has been taking chlorthalidone prn only when notes leg edema- encouraged her to use this daily as it is part of her BP control treatment plan  DASH diet  Weight loss  150 minutes of moderate intensity exercise weekly  -     metoprolol succinate (TOPROL-XL) 25 mg XL tablet; Take 1 Tab by mouth daily. -     lisinopriL (PRINIVIL, ZESTRIL) 10 mg tablet; Take 1 Tab by mouth daily. -     chlorthalidone (HYGROTEN) 25 mg tablet; Take 1 Tab by mouth daily.  -     CBC W/O DIFF; Future  -     TSH 3RD GENERATION; Future  -     METABOLIC PANEL, COMPREHENSIVE; Future    2. Elevated LDL cholesterol level  TLC Diet:   Saturated fat <7% of calories, cholesterol <200 mg/day   Consider increased viscous (soluble) fiber (10-25 g/day) and plant stanols/sterols  (2g/day) as therapeutic options to enhance LDL lowering  Weight management  Increased physical activity.  -     LIPID PANEL; Future        Follow-up and Dispositions    · Return in about 6 months (around 12/16/2020), or if symptoms worsen or fail to improve. 712  Subjective:   Nicole Shultz is a 53 y.o. female who was seen for Follow-up; Medication Refill; Hypertension; and Cholesterol Problem    HPI:    Cardiovascular risk analysis - LDL goal is under 100  hypertension  hyperlipidemia. Compliance with treatment thus far has been good. Diet and Lifestyle: generally follows a low fat low cholesterol diet, generally follows a low sodium diet, exercises regularly, nonsmoker  Home BP Monitoring: is well controlled at home, ranging 120-130's/70-80's    Cardiovascular ROS: taking medications as instructed, no medication side effects noted, no TIA's, no chest pain on exertion, no dyspnea on exertion, no swelling of ankles, no orthostatic dizziness or lightheadedness, no orthopnea or paroxysmal nocturnal dyspnea, no palpitations, no intermittent claudication symptoms. Prior to Admission medications    Medication Sig Start Date End Date Taking? Authorizing Provider   metoprolol succinate (TOPROL-XL) 25 mg XL tablet Take 1 Tab by mouth daily. 6/16/20  Yes Tommie Gama, NP   lisinopriL (PRINIVIL, ZESTRIL) 10 mg tablet Take 1 Tab by mouth daily. 6/16/20  Yes Tommie Gama, NP   chlorthalidone (HYGROTEN) 25 mg tablet Take 1 Tab by mouth daily. 6/16/20  Yes Tommie Gama, NP   chlorthalidone (HYGROTEN) 25 mg tablet Take 1 Tab by mouth daily. 12/31/19 6/16/20  Tristan Daniels NP   lisinopril (PRINIVIL, ZESTRIL) 10 mg tablet Take 1 Tab by mouth daily. 12/31/19 6/16/20  Tristan Daniels NP   metoprolol succinate (TOPROL-XL) 25 mg XL tablet Take 1 Tab by mouth daily. 12/31/19 6/16/20  Richar Gama NP   fluticasone propionate (FLONASE) 50 mcg/actuation nasal spray as needed. Provider, Historical     Allergies   Allergen Reactions    Sulfa (Sulfonamide Antibiotics) Shortness of Breath       Patient Active Problem List   Diagnosis Code    Essential hypertension I10    Non-seasonal allergic rhinitis J30.89    Overweight (BMI 25.0-29. 9) [id number]     Allergies   Allergen Reactions    Sulfa (Sulfonamide Antibiotics) Shortness of Breath     Past Medical History:   Diagnosis Date    Hypertension      Past Surgical History:   Procedure Laterality Date    HX BREAST BIOPSY Left     benign    HX BREAST LUMPECTOMY Left     benign Family History   Problem Relation Age of Onset    Stroke Mother     Heart Failure Mother     Cancer Mother     Gout Father     Heart Attack Sister     Diabetes Sister     Hypertension Sister     Breast Cancer Paternal Aunt      Social History     Tobacco Use    Smoking status: Never Smoker    Smokeless tobacco: Never Used   Substance Use Topics    Alcohol use: Yes       ROS      Objective: There were no vitals taken for this visit. General: alert, cooperative, no distress   Mental  status: normal mood, behavior, speech, dress, motor activity, and thought processes, able to follow commands   HENT: NCAT   Neck: no visualized mass   Resp: no respiratory distress   Neuro: no gross deficits   Skin: no discoloration or lesions of concern on visible areas   Psychiatric: normal affect, consistent with stated mood, no evidence of hallucinations     Additional exam findings: We discussed the expected course, resolution and complications of the diagnosis(es) in detail. Medication risks, benefits, costs, interactions, and alternatives were discussed as indicated. I advised her to contact the office if her condition worsens, changes or fails to improve as anticipated. She expressed understanding with the diagnosis(es) and plan. Hanna Vital is a 53 y.o. female who was evaluated by a video visit encounter for concerns as above. Patient identification was verified prior to start of the visit. A caregiver was present when appropriate. Due to this being a TeleHealth encounter (During [place] [address] public health emergency), evaluation of the following organ systems was limited: Vitals/Constitutional/EENT/Resp/CV/GI//MS/Neuro/Skin/Heme-Lymph-Imm.   Pursuant to the emergency declaration under the [facility] waiver authority and the [employer] General Act, this Virtual  Visit was conducted, with patient's (and/or legal guardian's) consent, to reduce the patient's risk of exposure to COVID-19 and provide necessary medical care. Services were provided through a video synchronous discussion virtually to substitute for in-person clinic visit. Patient and provider were located at their individual homes.       [place] Canales NP

## 2020-06-16 NOTE — PATIENT INSTRUCTIONS
DASH Diet: Care Instructions Your Care Instructions The DASH diet is an eating plan that can help lower your blood pressure. DASH stands for Dietary Approaches to Stop Hypertension. Hypertension is high blood pressure. The DASH diet focuses on eating foods that are high in calcium, potassium, and magnesium. These nutrients can lower blood pressure. The foods that are highest in these nutrients are fruits, vegetables, low-fat dairy products, nuts, seeds, and legumes. But taking calcium, potassium, and magnesium supplements instead of eating foods that are high in those nutrients does not have the same effect. The DASH diet also includes whole grains, fish, and poultry. The DASH diet is one of several lifestyle changes your doctor may recommend to lower your high blood pressure. Your doctor may also want you to decrease the amount of sodium in your diet. Lowering sodium while following the DASH diet can lower blood pressure even further than just the DASH diet alone. Follow-up care is a key part of your treatment and safety. Be sure to make and go to all appointments, and call your doctor if you are having problems. It's also a good idea to know your test results and keep a list of the medicines you take. How can you care for yourself at home? Following the DASH diet · Eat 4 to 5 servings of fruit each day. A serving is 1 medium-sized piece of fruit, ½ cup chopped or canned fruit, 1/4 cup dried fruit, or 4 ounces (½ cup) of fruit juice. Choose fruit more often than fruit juice. · Eat 4 to 5 servings of vegetables each day. A serving is 1 cup of lettuce or raw leafy vegetables, ½ cup of chopped or cooked vegetables, or 4 ounces (½ cup) of vegetable juice. Choose vegetables more often than vegetable juice. · Get 2 to 3 servings of low-fat and fat-free dairy each day. A serving is 8 ounces of milk, 1 cup of yogurt, or 1 ½ ounces of cheese. · Eat 6 to 8 servings of grains each day. A serving is 1 slice of bread, 1 ounce of dry cereal, or ½ cup of cooked rice, pasta, or cooked cereal. Try to choose whole-grain products as much as possible. · Limit lean meat, poultry, and fish to 2 servings each day. A serving is 3 ounces, about the size of a deck of cards. · Eat 4 to 5 servings of nuts, seeds, and legumes (cooked dried beans, lentils, and split peas) each week. A serving is 1/3 cup of nuts, 2 tablespoons of seeds, or ½ cup of cooked beans or peas. · Limit fats and oils to 2 to 3 servings each day. A serving is 1 teaspoon of vegetable oil or 2 tablespoons of salad dressing. · Limit sweets and added sugars to 5 servings or less a week. A serving is 1 tablespoon jelly or jam, ½ cup sorbet, or 1 cup of lemonade. · Eat less than 2,300 milligrams (mg) of sodium a day. If you limit your sodium to 1,500 mg a day, you can lower your blood pressure even more. Tips for success · Start small. Do not try to make dramatic changes to your diet all at once. You might feel that you are missing out on your favorite foods and then be more likely to not follow the plan. Make small changes, and stick with them. Once those changes become habit, add a few more changes. · Try some of the following: ? Make it a goal to eat a fruit or vegetable at every meal and at snacks. This will make it easy to get the recommended amount of fruits and vegetables each day. ? Try yogurt topped with fruit and nuts for a snack or healthy dessert. ? Add lettuce, tomato, cucumber, and onion to sandwiches. ? Combine a ready-made pizza crust with low-fat mozzarella cheese and lots of vegetable toppings. Try using tomatoes, squash, spinach, broccoli, carrots, cauliflower, and onions. ? Have a variety of cut-up vegetables with a low-fat dip as an appetizer instead of chips and dip. ? Sprinkle sunflower seeds or chopped almonds over salads.  Or try adding chopped walnuts or almonds to cooked vegetables. ? Try some vegetarian meals using beans and peas. Add garbanzo or kidney beans to salads. Make burritos and tacos with mashed lozano beans or black beans. Where can you learn more? Go to http://vianney-yani.info/ Enter H398 in the search box to learn more about \"DASH Diet: Care Instructions. \" Current as of: December 16, 2019               Content Version: 12.5 © 6696-9149 [employer]. Care instructions adapted under license by [employer] (which disclaims liability or warranty for this information). If you have questions about a medical condition or this instruction, always ask your healthcare professional. [address] any warranty or liability for your use of this information. High Cholesterol: Care Instructions Your Care Instructions Cholesterol is a type of fat in your blood. It is needed for many body functions, such as making new cells. Cholesterol is made by your body. It also comes from food you eat. High cholesterol means that you have too much of the fat in your blood. This raises your risk of a heart attack and stroke. LDL and HDL are part of your total cholesterol. LDL is the \"bad\" cholesterol. High LDL can raise your risk for heart disease, heart attack, and stroke. HDL is the \"good\" cholesterol. It helps clear bad cholesterol from the body. High HDL is linked with a lower risk of heart disease, heart attack, and stroke. Your cholesterol levels help your doctor find out your risk for having a heart attack or stroke. You and your doctor can talk about whether you need to lower your risk and what treatment is best for you. A heart-healthy lifestyle along with medicines can help lower your cholesterol and your risk. The way you choose to lower your risk will depend on how high your risk is for heart attack and stroke.  It will also depend on how you feel about taking medicines. Follow-up care is a key part of your treatment and safety. Be sure to make and go to all appointments, and call your doctor if you are having problems. It's also a good idea to know your test results and keep a list of the medicines you take. How can you care for yourself at home? · Eat a variety of foods every day. Good choices include fruits, vegetables, whole grains (like oatmeal), dried beans and peas, nuts and seeds, soy products (like tofu), and fat-free or low-fat dairy products. · Replace butter, margarine, and hydrogenated or partially hydrogenated oils with olive and canola oils. (Canola oil margarine without trans fat is fine.) · Replace red meat with fish, poultry, and soy protein (like tofu). · Limit processed and packaged foods like chips, crackers, and cookies. · Bake, broil, or steam foods. Don't canela them. · Be physically active. Get at least 30 minutes of exercise on most days of the week. Walking is a good choice. You also may want to do other activities, such as running, swimming, cycling, or playing tennis or team sports. · Stay at a healthy weight or lose weight by making the changes in eating and physical activity listed above. Losing just a small amount of weight, even 5 to 10 pounds, can reduce your risk for having a heart attack or stroke. · Do not smoke. When should you call for help? Watch closely for changes in your health, and be sure to contact your doctor if: 
· You need help making lifestyle changes. · You have questions about your medicine. Where can you learn more? Go to http://vianney-yani.info/ Enter Z825 in the search box to learn more about \"High Cholesterol: Care Instructions. \" Current as of: December 16, 2019               Content Version: 12.5 © 7853-5385 [employer].   
Care instructions adapted under license by [employer] (which disclaims liability or warranty for this information). If you have questions about a medical condition or this instruction, always ask your healthcare professional. [address] any warranty or liability for your use of this information.

## 2020-06-24 LAB
ALBUMIN SERPL-MCNC: 4.5 G/DL (ref 3.8–4.9)
ALBUMIN/GLOB SERPL: 1.9 {RATIO} (ref 1.2–2.2)
ALP SERPL-CCNC: 62 IU/L (ref 39–117)
ALT SERPL-CCNC: 13 IU/L (ref 0–32)
AST SERPL-CCNC: 19 IU/L (ref 0–40)
BILIRUB SERPL-MCNC: 0.8 MG/DL (ref 0–1.2)
BUN SERPL-MCNC: 14 MG/DL (ref 6–24)
BUN/CREAT SERPL: 17 (ref 9–23)
CALCIUM SERPL-MCNC: 9.2 MG/DL (ref 8.7–10.2)
CHLORIDE SERPL-SCNC: 101 MMOL/L (ref 96–106)
CHOLEST SERPL-MCNC: 178 MG/DL (ref 100–199)
CO2 SERPL-SCNC: 26 MMOL/L (ref 20–29)
CREAT SERPL-MCNC: 0.84 MG/DL (ref 0.57–1)
ERYTHROCYTE [DISTWIDTH] IN BLOOD BY AUTOMATED COUNT: 14.4 % (ref 11.7–15.4)
GLOBULIN SER CALC-MCNC: 2.4 G/DL (ref 1.5–4.5)
GLUCOSE SERPL-MCNC: 88 MG/DL (ref 65–99)
HCT VFR BLD AUTO: 39.5 % (ref 34–46.6)
HDLC SERPL-MCNC: 60 MG/DL
HGB BLD-MCNC: 13.5 G/DL (ref 11.1–15.9)
INTERPRETATION, 910389: NORMAL
LDLC SERPL CALC-MCNC: 103 MG/DL (ref 0–99)
MCH RBC QN AUTO: 28.4 PG (ref 26.6–33)
MCHC RBC AUTO-ENTMCNC: 34.2 G/DL (ref 31.5–35.7)
MCV RBC AUTO: 83 FL (ref 79–97)
PLATELET # BLD AUTO: 203 X10E3/UL (ref 150–450)
POTASSIUM SERPL-SCNC: 4 MMOL/L (ref 3.5–5.2)
PROT SERPL-MCNC: 6.9 G/DL (ref 6–8.5)
RBC # BLD AUTO: 4.75 X10E6/UL (ref 3.77–5.28)
SODIUM SERPL-SCNC: 143 MMOL/L (ref 134–144)
SPECIMEN STATUS REPORT, ROLRST: NORMAL
TRIGL SERPL-MCNC: 76 MG/DL (ref 0–149)
TSH SERPL DL<=0.005 MIU/L-ACNC: 0.61 UIU/ML (ref 0.45–4.5)
VLDLC SERPL CALC-MCNC: 15 MG/DL (ref 5–40)
WBC # BLD AUTO: 6 X10E3/UL (ref 3.4–10.8)

## 2020-06-26 NOTE — PROGRESS NOTES
Electrolytes, liver and kidney function normal.   Blood counts normal  LDL just above goal- recommend heart healthy diet, repeat fasting lipids in 12 months.

## 2020-10-16 ENCOUNTER — TRANSCRIBE ORDER (OUTPATIENT)
Dept: SCHEDULING | Age: 55
End: 2020-10-16

## 2020-10-16 DIAGNOSIS — Z12.31 VISIT FOR SCREENING MAMMOGRAM: Primary | ICD-10-CM

## 2020-11-06 ENCOUNTER — HOSPITAL ENCOUNTER (OUTPATIENT)
Dept: MAMMOGRAPHY | Age: 55
Discharge: HOME | End: 2020-11-06
Payer: COMMERCIAL

## 2020-11-06 DIAGNOSIS — Z12.31 VISIT FOR SCREENING MAMMOGRAM: ICD-10-CM

## 2020-11-06 PROCEDURE — 77067 SCR MAMMO BI INCL CAD: CPT

## 2020-12-09 ENCOUNTER — VIRTUAL VISIT (OUTPATIENT)
Dept: FAMILY MEDICINE CLINIC | Age: 55
End: 2020-12-09
Payer: COMMERCIAL

## 2020-12-09 DIAGNOSIS — Z91.89 AT INCREASED RISK OF EXPOSURE TO COVID-19 VIRUS: ICD-10-CM

## 2020-12-09 DIAGNOSIS — J30.89 NON-SEASONAL ALLERGIC RHINITIS, UNSPECIFIED TRIGGER: Primary | ICD-10-CM

## 2020-12-09 DIAGNOSIS — I10 ESSENTIAL HYPERTENSION: ICD-10-CM

## 2020-12-09 PROCEDURE — 99213 OFFICE O/P EST LOW 20 MIN: CPT | Performed by: NURSE PRACTITIONER

## 2020-12-09 RX ORDER — METOPROLOL SUCCINATE 25 MG/1
25 TABLET, EXTENDED RELEASE ORAL DAILY
Qty: 90 TAB | Refills: 1 | Status: SHIPPED | OUTPATIENT
Start: 2020-12-09 | End: 2021-07-26 | Stop reason: SDUPTHER

## 2020-12-09 RX ORDER — CETIRIZINE HCL 10 MG
10 TABLET ORAL DAILY
Qty: 90 TAB | Refills: 1 | Status: SHIPPED
Start: 2020-12-09 | End: 2021-07-26 | Stop reason: ALTCHOICE

## 2020-12-09 RX ORDER — BENZONATATE 200 MG/1
200 CAPSULE ORAL
Qty: 30 CAP | Refills: 0 | Status: SHIPPED | OUTPATIENT
Start: 2020-12-09 | End: 2020-12-19

## 2020-12-09 RX ORDER — LISINOPRIL 10 MG/1
10 TABLET ORAL DAILY
Qty: 90 TAB | Refills: 1 | Status: SHIPPED
Start: 2020-12-09 | End: 2021-07-26 | Stop reason: SINTOL

## 2020-12-09 RX ORDER — CHLORTHALIDONE 25 MG/1
25 TABLET ORAL DAILY
Qty: 90 TAB | Refills: 1 | Status: SHIPPED | OUTPATIENT
Start: 2020-12-09 | End: 2021-07-26 | Stop reason: SDUPTHER

## 2020-12-09 RX ORDER — FLUTICASONE PROPIONATE 50 MCG
2 SPRAY, SUSPENSION (ML) NASAL DAILY
Qty: 3 BOTTLE | Refills: 1 | Status: SHIPPED
Start: 2020-12-09 | End: 2021-07-26 | Stop reason: ALTCHOICE

## 2020-12-09 NOTE — LETTER
NOTIFICATION RETURN TO WORK / SCHOOL 
 
12/9/2020 8:34 AM 
 
Ms. Nicole Shultz [address] [facility] [address] To Whom It May Concern: 
 
Nicole Shultz is currently under the care of [address]. According to current CDC guidance, Ms. Isai Whaley is at high risk for serious or fatal infection related to [address] due to her age and chronic conditions (hypertension). I have advised that she should work from home if at all possible to help mitigate her risk of exposure. If there are questions or concerns please have the patient contact our office.  
 
 
 
Sincerely, 
 
 
Nimisha Rizvi NP

## 2020-12-09 NOTE — PROGRESS NOTES
Dalphine Goodpasture is a 63 y.o. female who was seen by synchronous (real-time) audio-video technology on 12/9/2020 for Sinus Infection        Assessment & Plan:   Diagnoses and all orders for this visit:    1. Non-seasonal allergic rhinitis, unspecified trigger  Symptoms not consistent with sinus infection  Add cetirizine, flonase, tessalon, monitor response  -     cetirizine (ZYRTEC) 10 mg tablet; Take 1 Tab by mouth daily. -     fluticasone propionate (FLONASE) 50 mcg/actuation nasal spray; 2 Sprays by Both Nostrils route daily. -     benzonatate (TESSALON) 200 mg capsule; Take 1 Cap by mouth three (3) times daily as needed for Cough for up to 10 days. 2. Essential hypertension  Controlled at last check, recommend next visit in person for BP check  Continue metoprolol, lisinopril, chlorthaldioine  Low sodium diet  150 minutes moderate exercise weekly recommended  Weight loss recommended  -     metoprolol succinate (TOPROL-XL) 25 mg XL tablet; Take 1 Tab by mouth daily. -     lisinopriL (PRINIVIL, ZESTRIL) 10 mg tablet; Take 1 Tab by mouth daily. -     chlorthalidone (HYGROTON) 25 mg tablet; Take 1 Tab by mouth daily. 3. At increased risk of exposure to COVID-19 virus  Letter written for patient's employer      Follow-up and Dispositions    · Return in about 3 months (around 3/9/2021) for blood pressure. I have discussed the diagnosis with the patient and the intended plan as seen in the above orders, and questions were answered concerning future plans. Patient conveyed understanding of the plan at the time of the visit. 712  Subjective:     HPI:    Presents for evaluation of HTN, cough, and risk assessment for COVID19 exposure.     Diet and Lifestyle: generally follows a low fat low cholesterol diet, generally follows a low sodium diet, exercises sporadically, nonsmoker  Home BP Monitoring: is not measured at home    Cardiovascular ROS: taking medications as instructed, no medication side effects noted, no TIA's, no chest pain on exertion, no dyspnea on exertion, no swelling of ankles, no orthostatic dizziness or lightheadedness, no orthopnea or paroxysmal nocturnal dyspnea, no palpitations, no intermittent claudication symptoms. C/o ears itching x 3 weeks, nasal congestion, nonproductive cough, worse at night for the past week. Some mild sinus pressure. No fever or chills. No wheezing or shortness of breath. No nausea, abdominal pain, or diarrhea. No change in taste or smell. Not sleeping due to the cough. Has not been taking flonase consistently, almost out of this. Has tried 1-2 doses of benadryl at bedtime to help with sleep with modest relief. 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 She'd like a letter documenting her risk so that she can begin the process of transitioning to work from home status with her employer. Prior to Admission medications    Medication Sig Start Date End Date Taking? Authorizing Provider   cetirizine (ZYRTEC) 10 mg tablet Take 1 Tab by mouth daily. 12/9/20  Yes Tommie Gama NP   fluticasone propionate (FLONASE) 50 mcg/actuation nasal spray 2 Sprays by Both Nostrils route daily. 12/9/20  Yes Tommie Gama NP   benzonatate (TESSALON) 200 mg capsule Take 1 Cap by mouth three (3) times daily as needed for Cough for up to 10 days. 12/9/20 12/19/20 Yes Chasity Gama NP   metoprolol succinate (TOPROL-XL) 25 mg XL tablet Take 1 Tab by mouth daily. 12/9/20  Yes Tommie Gama NP   lisinopriL (PRINIVIL, ZESTRIL) 10 mg tablet Take 1 Tab by mouth daily. 12/9/20  Yes Tommie Gama, NP   chlorthalidone (HYGROTON) 25 mg tablet Take 1 Tab by mouth daily. 12/9/20  Yes Tommie Gama NP   fluticasone propionate (FLONASE) 50 mcg/actuation nasal spray as needed.    Yes Provider, Historical     Patient Active Problem List   Diagnosis Code    Essential hypertension I10    Non-seasonal allergic rhinitis J30.89    Overweight (BMI 25.0-29. 9) E66.3     Allergies   Allergen Reactions    Sulfa (Sulfonamide Antibiotics) Shortness of Breath     Past Medical History:   Diagnosis Date    Hypertension      Past Surgical History:   Procedure Laterality Date    HX BREAST BIOPSY Left     benign    HX BREAST LUMPECTOMY Left     benign     Family History   Problem Relation Age of Onset    Stroke Mother     Heart Failure Mother     Cancer Mother     Gout Father     Heart Attack Sister     Diabetes Sister     Hypertension Sister     Breast Cancer Paternal Aunt      Social History     Tobacco Use    Smoking status: Never Smoker    Smokeless tobacco: Never Used   Substance Use Topics    Alcohol use: Yes       Review of Systems   Constitutional: Negative for chills, fever, malaise/fatigue and weight loss. HENT: Positive for congestion. Negative for ear discharge, ear pain, sinus pain and sore throat. Eyes: Negative. Respiratory: Negative. Negative for stridor. Cardiovascular: Negative. Gastrointestinal: Negative. Genitourinary: Negative. Musculoskeletal: Negative. Skin: Negative. Neurological: Negative for headaches. Endo/Heme/Allergies: Negative. Psychiatric/Behavioral: Negative.         Objective:     Patient-Reported Vitals 6/16/2020   Patient-Reported Systolic  483   Patient-Reported Diastolic 78      General: alert, cooperative, no distress   Mental  status: normal mood, behavior, speech, dress, motor activity, and thought processes, able to follow commands   HENT: NCAT   Neck: no visualized mass   Resp: no respiratory distress   Neuro: no gross deficits   Skin: no discoloration or lesions of concern on visible areas   Psychiatric: normal affect, consistent with stated mood, no evidence of hallucinations     Additional exam findings:   none    We discussed the expected course, resolution and complications of the diagnosis(es) in detail. Medication risks, benefits, costs, interactions, and alternatives were discussed as indicated. I advised her to contact the office if her condition worsens, changes or fails to improve as anticipated. She expressed understanding with the diagnosis(es) and plan. Nicole Shultz, who was evaluated through a patient-initiated, synchronous (real-time) audio-video encounter, and/or her healthcare decision maker, is aware that it is a billable service, with coverage as determined by her insurance carrier. She provided verbal consent to proceed: Yes, and patient identification was verified. It was conducted pursuant to the emergency declaration under the [address] [place] [address] authority and the [employer] Act. A caregiver was present when appropriate. Ability to conduct physical exam was limited. I was at home. The patient was at home.       Dion Egan NP

## 2021-07-09 ENCOUNTER — TRANSCRIBE ORDER (OUTPATIENT)
Dept: SCHEDULING | Age: 56
End: 2021-07-09

## 2021-07-09 DIAGNOSIS — Z12.31 BREAST CANCER SCREENING BY MAMMOGRAM: Primary | ICD-10-CM

## 2021-07-19 ENCOUNTER — DOCUMENTATION ONLY (OUTPATIENT)
Dept: FAMILY MEDICINE CLINIC | Age: 56
End: 2021-07-19

## 2021-07-19 NOTE — PROGRESS NOTES
Left VM for patient, July 30 appt with Tommie needs to be rescheduled, provider out of the office. Provider will be in office on July 26 or we can schedule for another day.

## 2021-07-26 ENCOUNTER — OFFICE VISIT (OUTPATIENT)
Dept: FAMILY MEDICINE CLINIC | Age: 56
End: 2021-07-26
Payer: COMMERCIAL

## 2021-07-26 VITALS
OXYGEN SATURATION: 100 % | RESPIRATION RATE: 16 BRPM | TEMPERATURE: 98.2 F | BODY MASS INDEX: 29.63 KG/M2 | DIASTOLIC BLOOD PRESSURE: 78 MMHG | HEIGHT: 67 IN | SYSTOLIC BLOOD PRESSURE: 115 MMHG | WEIGHT: 188.8 LBS | HEART RATE: 65 BPM

## 2021-07-26 DIAGNOSIS — J30.89 NON-SEASONAL ALLERGIC RHINITIS, UNSPECIFIED TRIGGER: ICD-10-CM

## 2021-07-26 DIAGNOSIS — R05.9 COUGH: ICD-10-CM

## 2021-07-26 DIAGNOSIS — I10 ESSENTIAL HYPERTENSION: Primary | ICD-10-CM

## 2021-07-26 DIAGNOSIS — E78.00 ELEVATED LDL CHOLESTEROL LEVEL: ICD-10-CM

## 2021-07-26 PROCEDURE — 99214 OFFICE O/P EST MOD 30 MIN: CPT | Performed by: NURSE PRACTITIONER

## 2021-07-26 RX ORDER — METOPROLOL SUCCINATE 25 MG/1
25 TABLET, EXTENDED RELEASE ORAL DAILY
Qty: 90 TABLET | Refills: 1 | Status: SHIPPED | OUTPATIENT
Start: 2021-07-26 | End: 2022-04-25 | Stop reason: SDUPTHER

## 2021-07-26 RX ORDER — BENZONATATE 200 MG/1
200 CAPSULE ORAL
Qty: 30 CAPSULE | Refills: 0 | Status: SHIPPED | OUTPATIENT
Start: 2021-07-26 | End: 2021-08-05

## 2021-07-26 RX ORDER — FLUTICASONE PROPIONATE 50 MCG
2 SPRAY, SUSPENSION (ML) NASAL DAILY
Qty: 1 BOTTLE | Refills: 5 | Status: SHIPPED | OUTPATIENT
Start: 2021-07-26 | End: 2022-08-09 | Stop reason: SDUPTHER

## 2021-07-26 RX ORDER — CHLORTHALIDONE 25 MG/1
25 TABLET ORAL DAILY
Qty: 90 TABLET | Refills: 1 | Status: SHIPPED | OUTPATIENT
Start: 2021-07-26 | End: 2022-04-25 | Stop reason: SDUPTHER

## 2021-07-26 RX ORDER — MINERAL OIL
180 ENEMA (ML) RECTAL DAILY
Qty: 90 TABLET | Refills: 1 | Status: SHIPPED | OUTPATIENT
Start: 2021-07-26 | End: 2022-08-09 | Stop reason: SDUPTHER

## 2021-07-26 NOTE — PROGRESS NOTES
Kaye Gross is a 63 y.o. female    Chief Complaint   Patient presents with    Follow-up    Blood Pressure Check         1. Have you been to the ER, urgent care clinic since your last visit? Hospitalized since your last visit? No    2. Have you seen or consulted any other health care providers outside of the [address] [place] [address] since your last visit? Include any pap smears or colon screening.  No

## 2021-07-26 NOTE — PATIENT INSTRUCTIONS
Managing Your Allergies: Care Instructions  Your Care Instructions     Managing your allergies is an important part of staying healthy. Your doctor will help you find out what may be the cause of the allergies. Common causes of symptoms are house dust and dust mites, animal dander, mold, and pollen. As soon as you know what triggers your symptoms, try to avoid those things. This can help prevent allergy symptoms, asthma, and other health problems. Ask your doctor about allergy medicine or immunotherapy. These treatments may help reduce or prevent allergy symptoms. Follow-up care is a key part of your treatment and safety. Be sure to make and go to all appointments, and call your doctor if you are having problems. It's also a good idea to know your test results and keep a list of the medicines you take. How can you care for yourself at home? · If you have been told by your doctor that dust or dust mites are causing your allergy, decrease the dust around your bed:  ? Wash sheets, pillowcases, and other bedding in hot water every week. ? Use dust-proof covers for pillows, duvets, and mattresses. Avoid plastic covers because they tear easily and do not \"breathe. \" Wash as instructed on the label. ? Do not use any blankets and pillows that you do not need. ? Use blankets that you can wash in your washing machine. ? Consider removing drapes and carpets, which attract and hold dust, from your bedroom. · If you are allergic to house dust and mites, do not use home humidifiers. Your doctor can suggest ways you can control dust and mites. · Look for signs of cockroaches. Cockroaches cause allergic reactions. Use cockroach baits to get rid of them. Then, clean your home well. Cockroaches like areas where grocery bags, newspapers, empty bottles, or cardboard boxes are stored. Do not keep these inside your home, and keep trash and food containers sealed.  Seal off any spots where cockroaches might enter your home.  · If you are allergic to mold, get rid of furniture, rugs, and drapes that smell musty. Check for mold in the bathroom. · If you are allergic to outdoor pollen or mold spores, use air-conditioning. Change or clean all filters every month. Keep windows closed. · If you are allergic to pollen, stay inside when pollen counts are high. Use a vacuum  with a HEPA filter or a double-thickness filter at least two times each week. · Stay inside when air pollution is bad. Avoid paint fumes, perfumes, and other strong odors. · Avoid conditions that make your allergies worse. Stay away from smoke. Do not smoke or let anyone else smoke in your house. Do not use fireplaces or wood-burning stoves. · If you are allergic to your pets, change the air filter in your furnace every month. Use high-efficiency filters. · If you are allergic to pet dander, keep pets outside or out of your bedroom. Old carpet and cloth furniture can hold a lot of animal dander. You may need to replace them. When should you call for help? Give an epinephrine shot if:    · You think you are having a severe allergic reaction. After giving an epinephrine shot call 911, even if you feel better. Call 911 if:    · You have symptoms of a severe allergic reaction. These may include:  ? Sudden raised, red areas (hives) all over your body. ? Swelling of the throat, mouth, lips, or tongue. ? Trouble breathing. ? Passing out (losing consciousness). Or you may feel very lightheaded or suddenly feel weak, confused, or restless.     · You have been given an epinephrine shot, even if you feel better. Call your doctor now or seek immediate medical care if:    · You have symptoms of an allergic reaction, such as:  ? A rash or hives (raised, red areas on the skin). ? Itching. ? Swelling. ? Belly pain, nausea, or vomiting.    Watch closely for changes in your health, and be sure to contact your doctor if:    · Your allergies get worse.     · You need help controlling your allergies.     · You have questions about allergy testing.     · You do not get better as expected. Where can you learn more? Go to http://www.gray.com/  Enter L249 in the search box to learn more about \"Managing Your Allergies: Care Instructions. \"  Current as of: November 6, 2020               Content Version: 12.8  © 0103-8180 [employer]. Care instructions adapted under license by [employer] (which disclaims liability or warranty for this information). If you have questions about a medical condition or this instruction, always ask your healthcare professional. [place] [address] any warranty or liability for your use of this information. High Blood Pressure: Care Instructions  Overview     It's normal for blood pressure to go up and down throughout the day. But if it stays up, you have high blood pressure. Another name for high blood pressure is hypertension. Despite what a lot of people think, high blood pressure usually doesn't cause headaches or make you feel dizzy or lightheaded. It usually has no symptoms. But it does increase your risk of stroke, heart attack, and other problems. You and your doctor will talk about your risks of these problems based on your blood pressure. Your doctor will give you a goal for your blood pressure. Your goal will be based on your health and your age. Lifestyle changes, such as eating healthy and being active, are always important to help lower blood pressure. You might also take medicine to reach your blood pressure goal.  Follow-up care is a key part of your treatment and safety. Be sure to make and go to all appointments, and call your doctor if you are having problems. It's also a good idea to know your test results and keep a list of the medicines you take. How can you care for yourself at home?   Medical treatment  · If you stop taking your medicine, your blood pressure will go back up. You may take one or more types of medicine to lower your blood pressure. Be safe with medicines. Take your medicine exactly as prescribed. Call your doctor if you think you are having a problem with your medicine. · Talk to your doctor before you start taking aspirin every day. Aspirin can help certain people lower their risk of a heart attack or stroke. But taking aspirin isn't right for everyone, because it can cause serious bleeding. · See your doctor regularly. You may need to see the doctor more often at first or until your blood pressure comes down. · If you are taking blood pressure medicine, talk to your doctor before you take decongestants or anti-inflammatory medicine, such as ibuprofen. Some of these medicines can raise blood pressure. · Learn how to check your blood pressure at home. Lifestyle changes  · Stay at a healthy weight. This is especially important if you put on weight around the waist. Losing even 10 pounds can help you lower your blood pressure. · If your doctor recommends it, get more exercise. Walking is a good choice. Bit by bit, increase the amount you walk every day. Try for at least 30 minutes on most days of the week. You also may want to swim, bike, or do other activities. · Avoid or limit alcohol. Talk to your doctor about whether you can drink any alcohol. · Try to limit how much sodium you eat to less than 2,300 milligrams (mg) a day. Your doctor may ask you to try to eat less than 1,500 mg a day. · Eat plenty of fruits (such as bananas and oranges), vegetables, legumes, whole grains, and low-fat dairy products. · Lower the amount of saturated fat in your diet. Saturated fat is found in animal products such as milk, cheese, and meat. Limiting these foods may help you lose weight and also lower your risk for heart disease. · Do not smoke. Smoking increases your risk for heart attack and stroke.  If you need help quitting, talk to your doctor about stop-smoking programs and medicines. These can increase your chances of quitting for good. When should you call for help? Call  911 anytime you think you may need emergency care. This may mean having symptoms that suggest that your blood pressure is causing a serious heart or blood vessel problem. Your blood pressure may be over 180/120. For example, call 911 if:    · You have symptoms of a heart attack. These may include:  ? Chest pain or pressure, or a strange feeling in the chest.  ? Sweating. ? Shortness of breath. ? Nausea or vomiting. ? Pain, pressure, or a strange feeling in the back, neck, jaw, or upper belly or in one or both shoulders or arms. ? Lightheadedness or sudden weakness. ? A fast or irregular heartbeat.     · You have symptoms of a stroke. These may include:  ? Sudden numbness, tingling, weakness, or loss of movement in your face, arm, or leg, especially on only one side of your body. ? Sudden vision changes. ? Sudden trouble speaking. ? Sudden confusion or trouble understanding simple statements. ? Sudden problems with walking or balance. ? A sudden, severe headache that is different from past headaches.     · You have severe back or belly pain. Do not wait until your blood pressure comes down on its own. Get help right away. Call your doctor now or seek immediate care if:    · Your blood pressure is much higher than normal (such as 180/120 or higher), but you don't have symptoms.     · You think high blood pressure is causing symptoms, such as:  ? Severe headache.  ? Blurry vision. Watch closely for changes in your health, and be sure to contact your doctor if:    · Your blood pressure measures higher than your doctor recommends at least 2 times. That means the top number is higher or the bottom number is higher, or both.     · You think you may be having side effects from your blood pressure medicine. Where can you learn more?   Go to http://www.gray.com/  Enter [id number] in the search box to learn more about \"High Blood Pressure: Care Instructions. \"  Current as of: August 31, 2020               Content Version: 12.8  © 2006-2021 [employer]. Care instructions adapted under license by [employer] (which disclaims liability or warranty for this information). If you have questions about a medical condition or this instruction, always ask your healthcare professional. [address] any warranty or liability for your use of this information.

## 2021-07-26 NOTE — PROGRESS NOTES
Subjective:     Nicole Garza is a 63 y.o. female who presents for follow up of hypertension, elevated LDL, and evaluation of cough. Diet and Lifestyle: generally follows a low fat low cholesterol diet, generally follows a low sodium diet, exercises regularly, nonsmoker  Home BP Monitoring: is not measured at home    Cardiovascular risk analysis - LDL goal is under 100  hypertension  hyperlipidemia. Compliance with treatment thus far has been good. Cardiovascular ROS: taking medications as instructed, no medication side effects noted, no TIA's, no chest pain on exertion, no dyspnea on exertion, no swelling of ankles, no orthostatic dizziness or lightheadedness, no orthopnea or paroxysmal nocturnal dyspnea, no palpitations, no intermittent claudication symptoms. New concerns: c/o frequent cough, describes as starting with a tickle in throat. Sometimes brings up phlegm, other times dry. More bothersome at night, interfering with sleep. Has been taking cetirizine but not everyday. Using flonase nasal spray but not daily. tried cough syrup with codeine she had on hand, found this helpful. Denies sinus pressure/pain, purulent nasal discharge, sore throat. No fever or chills. Patient Active Problem List   Diagnosis Code    Essential hypertension I10    Non-seasonal allergic rhinitis J30.89    Overweight (BMI 25.0-29. 9) E66.3     Allergies   Allergen Reactions    Sulfa (Sulfonamide Antibiotics) Shortness of Breath     Past Medical History:   Diagnosis Date    Hypertension      Past Surgical History:   Procedure Laterality Date    HX BREAST BIOPSY Left     benign    HX BREAST LUMPECTOMY Left     benign     Family History   Problem Relation Age of Onset    Stroke Mother     Heart Failure Mother     Cancer Mother     Gout Father     Heart Attack Sister     Diabetes Sister     Hypertension Sister     Breast Cancer Paternal Aunt      Social History     Tobacco Use    Smoking status: Never Smoker    Smokeless tobacco: Never Used   Substance Use Topics    Alcohol use: Yes        Lab Results   Component Value Date/Time    WBC 6.0 06/23/2020 09:24 AM    HGB 13.5 06/23/2020 09:24 AM    HCT 39.5 06/23/2020 09:24 AM    PLATELET 405 01/39/8918 09:24 AM    MCV 83 06/23/2020 09:24 AM     Lab Results   Component Value Date/Time    Glucose 88 06/23/2020 09:24 AM    LDL, calculated 103 (H) 06/23/2020 09:24 AM    Creatinine 0.84 06/23/2020 09:24 AM      Lab Results   Component Value Date/Time    Cholesterol, total 178 06/23/2020 09:24 AM    HDL Cholesterol 60 06/23/2020 09:24 AM    LDL, calculated 103 (H) 06/23/2020 09:24 AM    Triglyceride 76 06/23/2020 09:24 AM     Lab Results   Component Value Date/Time    ALT (SGPT) 13 06/23/2020 09:24 AM    Alk. phosphatase 62 06/23/2020 09:24 AM    Bilirubin, total 0.8 06/23/2020 09:24 AM    Albumin 4.5 06/23/2020 09:24 AM    Protein, total 6.9 06/23/2020 09:24 AM    PLATELET 736 97/64/0038 09:24 AM     Lab Results   Component Value Date/Time    GFR est non-AA 78 06/23/2020 09:24 AM    GFR est AA 90 06/23/2020 09:24 AM    Creatinine 0.84 06/23/2020 09:24 AM    BUN 14 06/23/2020 09:24 AM    Sodium 143 06/23/2020 09:24 AM    Potassium 4.0 06/23/2020 09:24 AM    Chloride 101 06/23/2020 09:24 AM    CO2 26 06/23/2020 09:24 AM     Lab Results   Component Value Date/Time    TSH 0.612 06/23/2020 09:24 AM         Review of Systems, additional:  A comprehensive review of systems was negative except for that written in the HPI.     Objective:     Visit Vitals  /78 (BP 1 Location: Left upper arm, BP Patient Position: Sitting, BP Cuff Size: Adult)   Pulse 65   Temp 98.2 °F (36.8 °C) (Oral)   Resp 16   Ht 5' 7\" (1.702 m)   Wt 188 lb 12.8 oz (85.6 kg)   SpO2 100%   BMI 29.57 kg/m²     Physical Examination: General appearance - alert, well appearing, and in no distress, oriented to person, place, and time and well hydrated  Mental status - normal mood, behavior, speech, dress, motor activity, and thought processes  Eyes - sclera anicteric  Ears - TM normal bilaterally, external canals normal bilaterally  Mouth - pharynx pink, no exudate noted. Clear postnasal drainage is noted. Neck - supple, no significant adenopathy, thyroid exam: thyroid is normal in size without nodules or tenderness  Chest - clear to auscultation, no wheezes, rales or rhonchi, symmetric air entry. Heart - normal rate, regular rhythm, normal S1, S2, no murmurs, rubs, clicks or gallops, normal bilateral carotid upstroke without bruits, no JVD  Abdomen - soft, nontender, nondistended, no masses or organomegaly  bowel sounds normal  Neurological - alert, oriented, normal speech, no focal findings or movement disorder noted  Extremities - no pedal edema noted, intact peripheral pulses  Skin - normal coloration and turgor, no rashes, no suspicious skin lesions noted       Assessment/Plan:     reviewed diet, exercise and weight control  copy of written low fat low cholesterol diet provided and reviewed  cardiovascular risk and specific lipid/LDL goals reviewed  reviewed medications and side effects in detail  reviewed potential future medication changes and side effects. Diagnoses and all orders for this visit:    1. Essential hypertension  at goal  Continue chlorthalidone, metoprolol  Discontinue lisinopril  Low sodium diet  Weight loss  150 minutes of moderate intensity exercise weekly  -     METABOLIC PANEL, COMPREHENSIVE; Future  -     CBC W/O DIFF; Future  -     TSH 3RD GENERATION; Future  -     chlorthalidone (HYGROTON) 25 mg tablet; Take 1 Tablet by mouth daily. -     metoprolol succinate (TOPROL-XL) 25 mg XL tablet; Take 1 Tablet by mouth daily. 2. Elevated LDL cholesterol level  Heart healthy diet recommended   Repeat fasting lipids  -     LIPID PANEL; Future    3. Non-seasonal allergic rhinitis, unspecified trigger  Will transition to fexofenadine, discontinue cetirizine.  Encouraged to take daily  Continue flonase, improve compliance with daily dosing  -     fexofenadine (ALLEGRA) 180 mg tablet; Take 1 Tablet by mouth daily. -     fluticasone propionate (FLONASE) 50 mcg/actuation nasal spray; 2 Sprays by Both Nostrils route daily. 4. Cough  Add tessalon  Will discontinue lisinopril as well to eliminate possibility of ace-induced cough  -     benzonatate (TESSALON) 200 mg capsule; Take 1 Capsule by mouth three (3) times daily as needed for Cough for up to 10 days. Follow-up and Dispositions    · Return in about 6 months (around 1/26/2022), or if symptoms worsen or fail to improve. I have discussed the diagnosis with the patient and the intended plan as seen in the above orders. The patient has received an after-visit summary and questions were answered concerning future plans. Patient conveyed understanding of the plan at the time of the visit.     Ernestina Morel NP  7/26/2021

## 2021-07-29 LAB
ALBUMIN SERPL-MCNC: 4.4 G/DL (ref 3.8–4.9)
ALBUMIN/GLOB SERPL: 1.5 {RATIO} (ref 1.2–2.2)
ALP SERPL-CCNC: 61 IU/L (ref 48–121)
ALT SERPL-CCNC: 19 IU/L (ref 0–32)
AST SERPL-CCNC: 19 IU/L (ref 0–40)
BILIRUB SERPL-MCNC: 0.6 MG/DL (ref 0–1.2)
BUN SERPL-MCNC: 17 MG/DL (ref 6–24)
BUN/CREAT SERPL: 20 (ref 9–23)
CALCIUM SERPL-MCNC: 9.8 MG/DL (ref 8.7–10.2)
CHLORIDE SERPL-SCNC: 99 MMOL/L (ref 96–106)
CHOLEST SERPL-MCNC: 199 MG/DL (ref 100–199)
CO2 SERPL-SCNC: 25 MMOL/L (ref 20–29)
CREAT SERPL-MCNC: 0.87 MG/DL (ref 0.57–1)
ERYTHROCYTE [DISTWIDTH] IN BLOOD BY AUTOMATED COUNT: 14 % (ref 11.7–15.4)
GLOBULIN SER CALC-MCNC: 2.9 G/DL (ref 1.5–4.5)
GLUCOSE SERPL-MCNC: 85 MG/DL (ref 65–99)
HCT VFR BLD AUTO: 39.4 % (ref 34–46.6)
HDLC SERPL-MCNC: 59 MG/DL
HGB BLD-MCNC: 13.3 G/DL (ref 11.1–15.9)
IMP & REVIEW OF LAB RESULTS: NORMAL
LDLC SERPL CALC-MCNC: 123 MG/DL (ref 0–99)
MCH RBC QN AUTO: 28.1 PG (ref 26.6–33)
MCHC RBC AUTO-ENTMCNC: 33.8 G/DL (ref 31.5–35.7)
MCV RBC AUTO: 83 FL (ref 79–97)
PLATELET # BLD AUTO: 199 X10E3/UL (ref 150–450)
POTASSIUM SERPL-SCNC: 3.7 MMOL/L (ref 3.5–5.2)
PROT SERPL-MCNC: 7.3 G/DL (ref 6–8.5)
RBC # BLD AUTO: 4.73 X10E6/UL (ref 3.77–5.28)
SODIUM SERPL-SCNC: 139 MMOL/L (ref 134–144)
TRIGL SERPL-MCNC: 94 MG/DL (ref 0–149)
TSH SERPL DL<=0.005 MIU/L-ACNC: 0.61 UIU/ML (ref 0.45–4.5)
VLDLC SERPL CALC-MCNC: 17 MG/DL (ref 5–40)
WBC # BLD AUTO: 7.4 X10E3/UL (ref 3.4–10.8)

## 2021-07-30 NOTE — PROGRESS NOTES
LDL is above goal and has worsened in the last year. Recommend heart healthy diet low in saturated fat, 150 minutes exercise weekly, and weight loss to improve this. Repeat fasting lipids in 6 months.   Blood counts normal.  Electrolytes, liver and kidney function normal.   Thyroid level is normal.

## 2021-09-10 ENCOUNTER — VIRTUAL VISIT (OUTPATIENT)
Dept: FAMILY MEDICINE CLINIC | Age: 56
End: 2021-09-10
Payer: COMMERCIAL

## 2021-09-10 DIAGNOSIS — J06.9 ACUTE URI: Primary | ICD-10-CM

## 2021-09-10 PROCEDURE — 99213 OFFICE O/P EST LOW 20 MIN: CPT | Performed by: NURSE PRACTITIONER

## 2021-09-10 NOTE — PROGRESS NOTES
Karrie Bach is a 63 y.o. female who was seen by synchronous (real-time) audio-video technology on 9/10/2021 for Sinus Infection (X 2 days. )        Assessment & Plan:   Diagnoses and all orders for this visit:    1. Acute URI  Symptoms concerning for covid19 infection  Recommend testing  [facility]ed [facility], she will check web to make appt and check procedures for preferred site  Requested  she advise office of results when rec'd  Isolate at home until results rec'd  Delsym 10 ml q12 hr prn for cough  Sudafed 30 mg q6 hours prn for 2-3 days for congestion  Rest  Fluids    Follow-up and Dispositions    · Return if symptoms worsen or fail to improve. I have discussed the diagnosis with the patient and the intended plan as seen in the above orders, and questions were answered concerning future plans. Patient conveyed understanding of the plan at the time of the visit. 712  Subjective:     HPI:    Presents for evaluation of congestion, cough x 2 days. C/o clear rhinorrhea, copious, with sore throat, ear itching, and cough. Notes cough occasionally productive for yellow sputum, but mainly non-productive. No headache. No facial pain/tenderness. No fever or chills. No wheezing or shortness of breath. Works at a school. Has been vaccinated for covid. Prior to Admission medications    Medication Sig Start Date End Date Taking? Authorizing Provider   fexofenadine (ALLEGRA) 180 mg tablet Take 1 Tablet by mouth daily. 7/26/21  Yes Tommie Gama, NP   fluticasone propionate (FLONASE) 50 mcg/actuation nasal spray 2 Sprays by Both Nostrils route daily. 7/26/21  Yes Tommie Gama, NP   chlorthalidone (HYGROTON) 25 mg tablet Take 1 Tablet by mouth daily. 7/26/21  Yes Tommie Gama, NP   metoprolol succinate (TOPROL-XL) 25 mg XL tablet Take 1 Tablet by mouth daily.  7/26/21  Yes Macrina Soliz NP     Patient Active Problem List   Diagnosis Code    Essential hypertension I10  Non-seasonal allergic rhinitis J30.89    Overweight (BMI 25.0-29. 9) E66.3     Allergies   Allergen Reactions    Sulfa (Sulfonamide Antibiotics) Shortness of Breath     Past Medical History:   Diagnosis Date    Hypertension      Past Surgical History:   Procedure Laterality Date    HX BREAST BIOPSY Left     benign    HX BREAST LUMPECTOMY Left     benign     Family History   Problem Relation Age of Onset    Stroke Mother     Heart Failure Mother     Cancer Mother     Gout Father     Heart Attack Sister     Diabetes Sister     Hypertension Sister     Breast Cancer Paternal Aunt      Social History     Tobacco Use    Smoking status: Never Smoker    Smokeless tobacco: Never Used   Substance Use Topics    Alcohol use: Yes       Review of Systems   Constitutional: Negative for chills, fever, malaise/fatigue and weight loss. HENT: Positive for congestion and sore throat. Negative for sinus pain. Eyes: Negative. Respiratory: Positive for cough. Negative for hemoptysis, sputum production, shortness of breath, wheezing and stridor. Cardiovascular: Negative. Gastrointestinal: Negative for abdominal pain, constipation, diarrhea, nausea and vomiting. Genitourinary: Negative. Musculoskeletal: Negative for myalgias. Skin: Negative. Neurological: Negative for headaches. Endo/Heme/Allergies: Positive for environmental allergies. Psychiatric/Behavioral: Negative.         Objective:     Patient-Reported Vitals 6/16/2020   Patient-Reported Systolic  823   Patient-Reported Diastolic 78      General: alert, cooperative, no distress   Mental  status: normal mood, behavior, speech, dress, motor activity, and thought processes, able to follow commands   HENT: NCAT   Neck: no visualized mass   Resp: no respiratory distress   Neuro: no gross deficits   Skin: no discoloration or lesions of concern on visible areas   Psychiatric: normal affect, consistent with stated mood, no evidence of hallucinations     Additional exam findings:   none    We discussed the expected course, resolution and complications of the diagnosis(es) in detail. Medication risks, benefits, costs, interactions, and alternatives were discussed as indicated. I advised her to contact the office if her condition worsens, changes or fails to improve as anticipated. She expressed understanding with the diagnosis(es) and plan. Nicole Shultz, was evaluated through a synchronous (real-time) audio-video encounter. The patient (or guardian if applicable) is aware that this is a billable service. Verbal consent to proceed has been obtained within the past 12 months. The visit was conducted pursuant to the emergency declaration under the [facility], [address] [place] [address] authority and the [employer] General Act. Patient identification was verified, and a caregiver was present when appropriate. The patient was located in a state where the provider was credentialed to provide care.     Juanito Mulligan NP  9/10/2021

## 2021-09-10 NOTE — PATIENT INSTRUCTIONS
Coronavirus ([id number]): Care Instructions  Overview  The coronavirus disease (COVID-19) is caused by a virus. Symptoms may include a fever, a cough, and shortness of breath. It mainly spreads person-to-person through droplets from coughing and sneezing. The virus also can spread when people are in close contact with someone who is infected. Most people have mild symptoms and can take care of themselves at home. If their symptoms get worse, they may need care in a hospital. Treatment may include medicines to reduce symptoms, plus breathing support such as oxygen therapy or a ventilator. It's important to not spread the virus to others. If you have COVID-19, wear a face cover anytime you are around other people. You need to isolate yourself while you are sick. Leave your home only if you need to get medical care or testing. Follow-up care is a key part of your treatment and safety. Be sure to make and go to all appointments, and call your doctor if you are having problems. It's also a good idea to know your test results and keep a list of the medicines you take. How can you care for yourself at home? · Get extra rest. It can help you feel better. · Drink plenty of fluids. This helps replace fluids lost from fever. Fluids also help ease a scratchy throat. Water, soup, fruit juice, and hot tea with lemon are good choices. · Take acetaminophen (such as Tylenol) to reduce a fever. It may also help with muscle aches. Read and follow all instructions on the label. · Use petroleum jelly on sore skin. This can help if the skin around your nose and lips becomes sore from rubbing a lot with tissues. Tips for self-isolation  · Limit contact with people in your home. If possible, stay in a separate bedroom and use a separate bathroom. · Wear a cloth face cover when you are around other people. It can help stop the spread of the virus when you cough or sneeze.   · If you have to leave home, avoid crowds and try to stay at least 6 feet away from other people. · Avoid contact with pets and other animals. · Cover your mouth and nose with a tissue when you cough or sneeze. Then throw it in the trash right away. · Wash your hands often, especially after you cough or sneeze. Use soap and water, and scrub for at least 20 seconds. If soap and water aren't available, use an alcohol-based hand . · Don't share personal household items. These include bedding, towels, cups and glasses, and eating utensils. · [address] in the warmest water allowed for the fabric type, and dry it completely. It's okay to wash other people's laundry with yours. · Clean and disinfect your home every day. Use household  and disinfectant wipes or sprays. Take special care to clean things that you grab with your hands. These include doorknobs, remote controls, phones, and handles on your refrigerator and microwave. And don't forget countertops, tabletops, bathrooms, and computer keyboards. When you can end self-isolation  · If you know or suspect that you have COVID-19, stay in self-isolation until:  ? You haven't had a fever for 24 hours while not taking medicines to lower the fever, and  ? Your symptoms have improved, and  ? It's been at least 10 days since your symptoms started. · Talk to your doctor about whether you also need testing, especially if you have a weakened immune system. When should you call for help? Call 911 anytime you think you may need emergency care. For example, call if you have life-threatening symptoms, such as:    · You have severe trouble breathing. (You can't talk at all.)     · You have constant chest pain or pressure.     · You are severely dizzy or lightheaded.     · You are confused or can't think clearly.     · Your face and lips have a blue color.     · You pass out (lose consciousness) or are very hard to wake up.    Call your doctor now or seek immediate medical care if:    · You have moderate trouble breathing. (You can't speak a full sentence.)     · You are coughing up blood (more than about 1 teaspoon).     · You have signs of low blood pressure. These include feeling lightheaded; being too weak to stand; and having cold, pale, clammy skin. Watch closely for changes in your health, and be sure to contact your doctor if:    · Your symptoms get worse.     · You are not getting better as expected. Call before you go to the doctor's office. Follow their instructions. And wear a cloth face cover. Current as of: December 18, 2020               Content Version: 12.7  © 2006-2021 Healthwise, [email or web address]. Care instructions adapted under license by [email or web address] (which disclaims liability or warranty for this information). If you have questions about a medical condition or this instruction, always ask your healthcare professional. [address] any warranty or liability for your use of this information.

## 2021-09-10 NOTE — LETTER
NOTIFICATION RETURN TO WORK / SCHOOL    9/10/2021 10:14 AM    Ms. Nicole Shultz  [address]  [facility]  [facility] [address]      To Whom It May Concern:    Nicole Shultz is currently under the care of [address]. Ms. Yael Lopez is having symptoms consistent with COVID-19 infection. She has been advised to isolate at home until the results of her COVID test have been received. If there are questions or concerns please have the patient contact our office.         Sincerely,      Errol Crowley NP

## 2021-09-13 ENCOUNTER — TELEPHONE (OUTPATIENT)
Dept: FAMILY MEDICINE CLINIC | Age: 56
End: 2021-09-13

## 2021-09-13 NOTE — TELEPHONE ENCOUNTER
Pt went to [facility] she has Pneumonia test was - for the covid and flu pt  is suppose to f/u from [facility] on 09/14/21 please advise

## 2021-09-15 ENCOUNTER — VIRTUAL VISIT (OUTPATIENT)
Dept: FAMILY MEDICINE CLINIC | Age: 56
End: 2021-09-15
Payer: COMMERCIAL

## 2021-09-15 DIAGNOSIS — J18.9 PNEUMONIA DUE TO INFECTIOUS ORGANISM, UNSPECIFIED LATERALITY, UNSPECIFIED PART OF LUNG: Primary | ICD-10-CM

## 2021-09-15 PROCEDURE — 99214 OFFICE O/P EST MOD 30 MIN: CPT | Performed by: NURSE PRACTITIONER

## 2021-09-15 RX ORDER — DOXYCYCLINE 100 MG/1
CAPSULE ORAL
COMMUNITY
Start: 2021-09-12 | End: 2022-08-09

## 2021-09-15 RX ORDER — CODEINE PHOSPHATE AND GUAIFENESIN 10; 100 MG/5ML; MG/5ML
SOLUTION ORAL
COMMUNITY
Start: 2021-09-10 | End: 2022-08-09

## 2021-09-15 RX ORDER — ALBUTEROL SULFATE 90 UG/1
2 AEROSOL, METERED RESPIRATORY (INHALATION)
Qty: 18 G | Refills: 1 | Status: SHIPPED | OUTPATIENT
Start: 2021-09-15 | End: 2022-08-09

## 2021-09-15 NOTE — PROGRESS NOTES
Caitlin Glaser is a 63 y.o. female , id x 2(name and ). Reviewed questionnaires, and  medications.     Chief Complaint   Patient presents with   [facility] ED Follow-up     [facility] on 2021, Dx with pneumonia       3 most recent PHQ Screens 9/15/2021   Little interest or pleasure in doing things Not at all   Feeling down, depressed, irritable, or hopeless Not at all   Total Score PHQ 2 0

## 2021-09-15 NOTE — PROGRESS NOTES
Nicole Shultz (: 1965) is a 63 y.o. female, established patient, here for evaluation of the following chief complaint(s):   ED Follow-up ([facility] on 2021, Dx with pneumonia)       ASSESSMENT/PLAN:  Below is the assessment and plan developed based on review of pertinent history, labs, studies, and medications. Diagnoses and all orders for this visit:    1. Pneumonia due to infectious organism, unspecified laterality, unspecified part of lung    Other orders  -     albuterol (PROVENTIL HFA, VENTOLIN HFA, PROAIR HFA) 90 mcg/actuation inhaler; Take 2 Puffs by inhalation every four (4) hours as needed for Wheezing.     refilled albuterol for prn use  Follow up if worsening sx    SUBJECTIVE/OBJECTIVE:  HPI  [facility]en in [facility] , given abx doxy x 10 days  Taking Mucinex to break it up  No fever, but does feel like it is getting worse with wheeze  Has no pmh of asthma      Review of Systems   A comprehensive review of system was obtained and negative except findings in the HPI    No data recorded     Physical Exam    [INSTRUCTIONS:  \"[x]\" Indicates a positive item  \"[]\" Indicates a negative item  -- DELETE ALL ITEMS NOT EXAMINED]    Constitutional: [x] Appears well-developed and well-nourished [x] No apparent distress      [] Abnormal -     Mental status: [x] Alert and awake  [x] Oriented to person/place/time [x] Able to follow commands    [] Abnormal -     Eyes:   EOM    [x]  Normal    [] Abnormal -   Sclera  [x]  Normal    [] Abnormal -          Discharge [x]  None visible   [] Abnormal -     HENT: [x] Normocephalic, atraumatic  [] Abnormal -   [x] Mouth/Throat: Mucous membranes are moist    External Ears [x] Normal  [] Abnormal -    Neck: [x] No visualized mass [] Abnormal -     Pulmonary/Chest: [x] Respiratory effort normal   [x] No visualized signs of difficulty breathing or respiratory distress        [] Abnormal -      Musculoskeletal:   [x] Normal gait with no signs of ataxia [x] Normal range of motion of neck        [] Abnormal -     Neurological:        [x] No Facial Asymmetry (Cranial nerve 7 motor function) (limited exam due to video visit)          [x] No gaze palsy        [] Abnormal -          Skin:        [x] No significant exanthematous lesions or discoloration noted on facial skin         [] Abnormal -            Psychiatric:       [x] Normal Affect [] Abnormal -        [x] No Hallucinations    Other pertinent observable physical exam findings:- none        On this date 09/15/2021 I have spent 15 minutes reviewing previous notes, test results and face to face (virtual) with the patient discussing the diagnosis and importance of compliance with the treatment plan as well as documenting on the day of the visit. Nicole Shultz, was evaluated through a synchronous (real-time) audio-video encounter. The patient (or guardian if applicable) is aware that this is a billable service. Verbal consent to proceed has been obtained within the past 12 months. The visit was conducted pursuant to the emergency declaration under the [address] [place] [address] authority and the [employer] General Act. Patient identification was verified, and a caregiver was present when appropriate. The patient was located in a state where the provider was credentialed to provide care. An electronic signature was used to authenticate this note.   -- Ephraim Perez NP

## 2021-11-24 ENCOUNTER — HOSPITAL ENCOUNTER (OUTPATIENT)
Dept: MAMMOGRAPHY | Age: 56
Discharge: HOME | End: 2021-11-24
Payer: COMMERCIAL

## 2021-11-24 DIAGNOSIS — Z12.31 BREAST CANCER SCREENING BY MAMMOGRAM: ICD-10-CM

## 2021-11-24 PROCEDURE — 77063 BREAST TOMOSYNTHESIS BI: CPT

## 2022-03-19 PROBLEM — I10 ESSENTIAL HYPERTENSION: Status: ACTIVE | Noted: 2019-08-12

## 2022-03-19 PROBLEM — E66.3 OVERWEIGHT (BMI 25.0-29.9): Status: ACTIVE | Noted: 2019-08-12

## 2022-03-19 PROBLEM — J30.89 NON-SEASONAL ALLERGIC RHINITIS: Status: ACTIVE | Noted: 2019-08-12

## 2022-04-25 DIAGNOSIS — I10 ESSENTIAL HYPERTENSION: ICD-10-CM

## 2022-04-25 RX ORDER — CHLORTHALIDONE 25 MG/1
25 TABLET ORAL DAILY
Qty: 90 TABLET | Refills: 1 | Status: SHIPPED | OUTPATIENT
Start: 2022-04-25 | End: 2022-08-09 | Stop reason: SDUPTHER

## 2022-04-25 RX ORDER — METOPROLOL SUCCINATE 25 MG/1
25 TABLET, EXTENDED RELEASE ORAL DAILY
Qty: 90 TABLET | Refills: 1 | Status: SHIPPED | OUTPATIENT
Start: 2022-04-25 | End: 2022-08-09 | Stop reason: SDUPTHER

## 2022-07-11 ENCOUNTER — TRANSCRIBE ORDER (OUTPATIENT)
Dept: SCHEDULING | Age: 57
End: 2022-07-11

## 2022-07-11 DIAGNOSIS — Z12.31 SCREENING MAMMOGRAM FOR HIGH-RISK PATIENT: Primary | ICD-10-CM

## 2022-07-26 LAB — COLONOSCOPY, EXTERNAL: NORMAL

## 2022-08-09 ENCOUNTER — OFFICE VISIT (OUTPATIENT)
Dept: FAMILY MEDICINE CLINIC | Age: 57
End: 2022-08-09
Payer: COMMERCIAL

## 2022-08-09 VITALS
HEIGHT: 67 IN | HEART RATE: 58 BPM | WEIGHT: 187.4 LBS | SYSTOLIC BLOOD PRESSURE: 108 MMHG | OXYGEN SATURATION: 100 % | TEMPERATURE: 97.8 F | RESPIRATION RATE: 14 BRPM | BODY MASS INDEX: 29.41 KG/M2 | DIASTOLIC BLOOD PRESSURE: 74 MMHG

## 2022-08-09 DIAGNOSIS — Z11.59 NEED FOR HEPATITIS C SCREENING TEST: ICD-10-CM

## 2022-08-09 DIAGNOSIS — E78.00 ELEVATED LDL CHOLESTEROL LEVEL: ICD-10-CM

## 2022-08-09 DIAGNOSIS — H60.391 OTHER INFECTIVE ACUTE OTITIS EXTERNA OF RIGHT EAR: ICD-10-CM

## 2022-08-09 DIAGNOSIS — I10 ESSENTIAL HYPERTENSION: Primary | ICD-10-CM

## 2022-08-09 DIAGNOSIS — J30.89 NON-SEASONAL ALLERGIC RHINITIS, UNSPECIFIED TRIGGER: ICD-10-CM

## 2022-08-09 PROCEDURE — 99214 OFFICE O/P EST MOD 30 MIN: CPT | Performed by: NURSE PRACTITIONER

## 2022-08-09 RX ORDER — CHLORTHALIDONE 25 MG/1
25 TABLET ORAL DAILY
Qty: 90 TABLET | Refills: 1 | Status: SHIPPED | OUTPATIENT
Start: 2022-08-09

## 2022-08-09 RX ORDER — METOPROLOL SUCCINATE 25 MG/1
25 TABLET, EXTENDED RELEASE ORAL DAILY
Qty: 90 TABLET | Refills: 1 | Status: SHIPPED | OUTPATIENT
Start: 2022-08-09

## 2022-08-09 RX ORDER — NEOMYCIN SULFATE, POLYMYXIN B SULFATE AND HYDROCORTISONE 10; 3.5; 1 MG/ML; MG/ML; [USP'U]/ML
3 SUSPENSION/ DROPS AURICULAR (OTIC) 4 TIMES DAILY
Qty: 10 ML | Refills: 0 | Status: SHIPPED | OUTPATIENT
Start: 2022-08-09 | End: 2022-08-19

## 2022-08-09 RX ORDER — MINERAL OIL
180 ENEMA (ML) RECTAL DAILY
Qty: 90 TABLET | Refills: 1 | Status: SHIPPED | OUTPATIENT
Start: 2022-08-09

## 2022-08-09 RX ORDER — FLUTICASONE PROPIONATE 50 MCG
2 SPRAY, SUSPENSION (ML) NASAL DAILY
Qty: 1 EACH | Refills: 5 | Status: SHIPPED | OUTPATIENT
Start: 2022-08-09

## 2022-08-09 NOTE — PROGRESS NOTES
Hanh Wylie is a 61 y.o. female      Chief Complaint   Patient presents with    Follow-up     Annual F/up        1. Have you been to the ER, urgent care clinic since your last visit? Hospitalized since your last visit? No    2. Have you seen or consulted any other health care providers outside of the [address] [place] [address] since your last visit? Include any pap smears or colon screening.  No

## 2022-08-09 NOTE — PROGRESS NOTES
Subjective:     Nicole Merrill is a 61 y.o. female who presents for follow up of hypertension and hyperlipidemia, and evaluation of bilateral ear pain/itching. .    Diet and Lifestyle: generally follows a low fat low cholesterol diet, generally follows a low sodium diet, exercises regularly, nonsmoker  Home BP Monitoring: is not measured at home    Cardiovascular risk analysis - LDL goal is under 100. Compliance with treatment thus far has been good. Cardiovascular ROS: taking medications as instructed, no medication side effects noted, no TIA's, no chest pain on exertion, no dyspnea on exertion, no swelling of ankles, no orthostatic dizziness or lightheadedness, no orthopnea or paroxysmal nocturnal dyspnea, no palpitations, no intermittent claudication symptoms. New concerns: Complains of intermittent episodes of discomfort and itching of bilateral ears, more bothersome on the right side at the present time. No change in hearing. No drainage noted from the ear. No fever or chills. .     Patient Active Problem List   Diagnosis Code    Essential hypertension I10    Non-seasonal allergic rhinitis J30.89    Overweight (BMI 25.0-29. 9) E66.3     Allergies   Allergen Reactions    Sulfa (Sulfonamide Antibiotics) Shortness of Breath     Past Medical History:   Diagnosis Date    Hypertension      Past Surgical History:   Procedure Laterality Date    HX BREAST BIOPSY Left 1997    benign     Family History   Problem Relation Age of Onset    Stroke Mother     Heart Failure Mother     Cancer Mother     Gout Father     Heart Attack Sister     Diabetes Sister     Hypertension Sister     Breast Cancer Paternal Aunt      Social History     Tobacco Use    Smoking status: Never    Smokeless tobacco: Never   Substance Use Topics    Alcohol use: Yes        Lab Results   Component Value Date/Time    WBC 7.4 07/28/2021 02:25 PM    HGB 13.3 07/28/2021 02:25 PM    HCT 39.4 07/28/2021 02:25 PM    PLATELET 316 92/89/4786 02:25 PM    MCV 83 07/28/2021 02:25 PM     Lab Results   Component Value Date/Time    Glucose 85 07/28/2021 02:25 PM    LDL, calculated 123 (H) 07/28/2021 02:25 PM    LDL, calculated 103 (H) 06/23/2020 09:24 AM    Creatinine 0.87 07/28/2021 02:25 PM      Lab Results   Component Value Date/Time    Cholesterol, total 199 07/28/2021 02:25 PM    HDL Cholesterol 59 07/28/2021 02:25 PM    LDL, calculated 123 (H) 07/28/2021 02:25 PM    LDL, calculated 103 (H) 06/23/2020 09:24 AM    Triglyceride 94 07/28/2021 02:25 PM     Lab Results   Component Value Date/Time    ALT (SGPT) 19 07/28/2021 02:25 PM    Alk. phosphatase 61 07/28/2021 02:25 PM    Bilirubin, total 0.6 07/28/2021 02:25 PM    Albumin 4.4 07/28/2021 02:25 PM    Protein, total 7.3 07/28/2021 02:25 PM    PLATELET 066 17/35/4111 02:25 PM       Lab Results   Component Value Date/Time    GFR est non-AA 75 07/28/2021 02:25 PM    GFR est AA 86 07/28/2021 02:25 PM    Creatinine 0.87 07/28/2021 02:25 PM    BUN 17 07/28/2021 02:25 PM    Sodium 139 07/28/2021 02:25 PM    Potassium 3.7 07/28/2021 02:25 PM    Chloride 99 07/28/2021 02:25 PM    CO2 25 07/28/2021 02:25 PM     Lab Results   Component Value Date/Time    TSH 0.606 07/28/2021 02:25 PM         Review of Systems, additional:  A comprehensive review of systems was negative except for that written in the HPI.     Objective:     Visit Vitals  /74 (BP 1 Location: Left upper arm, BP Patient Position: Sitting, BP Cuff Size: Adult)   Pulse (!) 58   Temp 97.8 °F (36.6 °C) (Oral)   Resp 14   Ht 5' 7\" (1.702 m)   Wt 187 lb 6.4 oz (85 kg)   SpO2 100%   BMI 29.35 kg/m²     Physical Examination: General appearance - alert, well appearing, and in no distress and oriented to person, place, and time  Mental status - normal mood, behavior, speech, dress, motor activity, and thought processes  Eyes - sclera anicteric  Ears- L TM normal, mild inflammation of external canal.  R TM normal, moderate inflammation of external canal with tenderness on manipulation of pinna. Neck - supple, no significant adenopathy, thyroid exam: thyroid is normal in size without nodules or tenderness  Chest - clear to auscultation, no wheezes, rales or rhonchi, symmetric air entry  Heart - normal rate, regular rhythm, normal S1, S2, no murmurs, rubs, clicks or gallops, normal bilateral carotid upstroke without bruits, no JVD  Abdomen - soft, nontender, nondistended, no masses or organomegaly  bowel sounds normal  Neurological - alert, oriented, normal speech, no focal findings or movement disorder noted  Extremities - no pedal edema noted, intact peripheral pulses, no edema, redness or tenderness in the calves or thighs  Skin - normal coloration and turgor, no rashes      Assessment/Plan:       reviewed diet, exercise and weight control  copy of written low fat low cholesterol diet provided and reviewed  cardiovascular risk and specific lipid/LDL goals reviewed  reviewed medications and side effects in detail  reviewed potential future medication changes and side effects. Diagnoses and all orders for this visit:    1. Essential hypertension  at goal  Continue metoprolol, chlorthalidone  Recommended:  Low sodium diet  Weight loss  150 minutes of moderate intensity exercise weekly  -     METABOLIC PANEL, COMPREHENSIVE; Future  -     CBC W/O DIFF; Future  -     TSH 3RD GENERATION; Future  -     metoprolol succinate (TOPROL-XL) 25 mg XL tablet; Take 1 Tablet by mouth in the morning.  -     chlorthalidone (HYGROTON) 25 mg tablet; Take 1 Tablet by mouth in the morning. 2. Elevated LDL cholesterol level  Repeat fasting lipids  Heart healthy diet recommended   -     LIPID PANEL; Future    3. Non-seasonal allergic rhinitis, unspecified trigger  Stable  Continue antihistamine, nasal steroid spray  -     fexofenadine (ALLEGRA) 180 mg tablet; Take 1 Tablet by mouth in the morning.  -     fluticasone propionate (FLONASE) 50 mcg/actuation nasal spray;  Take 2 Sprays by Both Nostrils route in the morning. 4. Need for hepatitis C screening test  -     HEPATITIS C AB; Future    5. Other infective acute otitis externa of right ear  Add rx  -     neomycin-polymyxin-hydrocortisone, buffered, (PEDIOTIC) 3.5-10,000-1 mg/mL-unit/mL-% otic suspension; Administer 3 Drops in right ear four (4) times daily for 10 days. Follow-up and Dispositions    Return in about 6 months (around 2/9/2023), or if symptoms worsen or fail to improve, for blood pressure. I have discussed the diagnosis with the patient and the intended plan as seen in the above orders. The patient has received an after-visit summary and questions were answered concerning future plans. Patient conveyed understanding of the plan at the time of the visit.     Nabil Chavez NP  8/9/2022

## 2022-08-10 LAB
ALBUMIN SERPL-MCNC: 4 G/DL (ref 3.8–4.9)
ALBUMIN/GLOB SERPL: 1.6 {RATIO} (ref 1.2–2.2)
ALP SERPL-CCNC: 50 IU/L (ref 44–121)
ALT SERPL-CCNC: 16 IU/L (ref 0–32)
AST SERPL-CCNC: 19 IU/L (ref 0–40)
BILIRUB SERPL-MCNC: 0.6 MG/DL (ref 0–1.2)
BUN SERPL-MCNC: 14 MG/DL (ref 6–24)
BUN/CREAT SERPL: 18 (ref 9–23)
CALCIUM SERPL-MCNC: 9.2 MG/DL (ref 8.7–10.2)
CHLORIDE SERPL-SCNC: 101 MMOL/L (ref 96–106)
CHOLEST SERPL-MCNC: 176 MG/DL (ref 100–199)
CO2 SERPL-SCNC: 27 MMOL/L (ref 20–29)
CREAT SERPL-MCNC: 0.78 MG/DL (ref 0.57–1)
EGFR: 89 ML/MIN/1.73
ERYTHROCYTE [DISTWIDTH] IN BLOOD BY AUTOMATED COUNT: 13.6 % (ref 11.7–15.4)
GLOBULIN SER CALC-MCNC: 2.5 G/DL (ref 1.5–4.5)
GLUCOSE SERPL-MCNC: 87 MG/DL (ref 65–99)
HCT VFR BLD AUTO: 38.2 % (ref 34–46.6)
HCV AB S/CO SERPL IA: <0.1 S/CO RATIO (ref 0–0.9)
HDLC SERPL-MCNC: 52 MG/DL
HGB BLD-MCNC: 12.7 G/DL (ref 11.1–15.9)
IMP & REVIEW OF LAB RESULTS: NORMAL
LDLC SERPL CALC-MCNC: 108 MG/DL (ref 0–99)
MCH RBC QN AUTO: 29.2 PG (ref 26.6–33)
MCHC RBC AUTO-ENTMCNC: 33.2 G/DL (ref 31.5–35.7)
MCV RBC AUTO: 88 FL (ref 79–97)
PLATELET # BLD AUTO: 176 X10E3/UL (ref 150–450)
POTASSIUM SERPL-SCNC: 3.5 MMOL/L (ref 3.5–5.2)
PROT SERPL-MCNC: 6.5 G/DL (ref 6–8.5)
RBC # BLD AUTO: 4.35 X10E6/UL (ref 3.77–5.28)
SODIUM SERPL-SCNC: 140 MMOL/L (ref 134–144)
TRIGL SERPL-MCNC: 87 MG/DL (ref 0–149)
TSH SERPL DL<=0.005 MIU/L-ACNC: 0.53 UIU/ML (ref 0.45–4.5)
VLDLC SERPL CALC-MCNC: 16 MG/DL (ref 5–40)
WBC # BLD AUTO: 5 X10E3/UL (ref 3.4–10.8)

## 2022-12-22 ENCOUNTER — HOSPITAL ENCOUNTER (OUTPATIENT)
Dept: MAMMOGRAPHY | Age: 57
Discharge: HOME | End: 2022-12-22
Payer: COMMERCIAL

## 2022-12-22 DIAGNOSIS — Z12.31 SCREENING MAMMOGRAM FOR HIGH-RISK PATIENT: ICD-10-CM

## 2022-12-22 PROCEDURE — 77063 BREAST TOMOSYNTHESIS BI: CPT

## 2023-05-17 RX ORDER — METOPROLOL SUCCINATE 25 MG/1
25 TABLET, EXTENDED RELEASE ORAL DAILY
Qty: 90 TABLET | Refills: 0 | Status: SHIPPED | OUTPATIENT
Start: 2023-05-17

## 2023-05-20 RX ORDER — FLUTICASONE PROPIONATE 50 MCG
2 SPRAY, SUSPENSION (ML) NASAL DAILY
COMMUNITY
Start: 2022-08-09

## 2023-05-20 RX ORDER — CHLORTHALIDONE 25 MG/1
25 TABLET ORAL DAILY
COMMUNITY
Start: 2022-08-09

## 2023-05-20 RX ORDER — METOPROLOL SUCCINATE 25 MG/1
25 TABLET, EXTENDED RELEASE ORAL DAILY
COMMUNITY
Start: 2022-08-09

## 2023-05-20 RX ORDER — FEXOFENADINE HCL 180 MG/1
180 TABLET ORAL DAILY
COMMUNITY
Start: 2022-08-09

## 2023-08-18 ENCOUNTER — OFFICE VISIT (OUTPATIENT)
Age: 58
End: 2023-08-18
Payer: COMMERCIAL

## 2023-08-18 VITALS
OXYGEN SATURATION: 98 % | SYSTOLIC BLOOD PRESSURE: 114 MMHG | HEIGHT: 67 IN | BODY MASS INDEX: 27.78 KG/M2 | DIASTOLIC BLOOD PRESSURE: 76 MMHG | TEMPERATURE: 97.3 F | HEART RATE: 56 BPM | WEIGHT: 177 LBS

## 2023-08-18 DIAGNOSIS — I10 ESSENTIAL (PRIMARY) HYPERTENSION: Primary | ICD-10-CM

## 2023-08-18 DIAGNOSIS — J30.89 OTHER ALLERGIC RHINITIS: ICD-10-CM

## 2023-08-18 DIAGNOSIS — E78.5 HYPERLIPIDEMIA LDL GOAL <100: ICD-10-CM

## 2023-08-18 PROCEDURE — 3078F DIAST BP <80 MM HG: CPT | Performed by: NURSE PRACTITIONER

## 2023-08-18 PROCEDURE — 3074F SYST BP LT 130 MM HG: CPT | Performed by: NURSE PRACTITIONER

## 2023-08-18 PROCEDURE — 99214 OFFICE O/P EST MOD 30 MIN: CPT | Performed by: NURSE PRACTITIONER

## 2023-08-18 RX ORDER — CHLORTHALIDONE 25 MG/1
25 TABLET ORAL DAILY
Qty: 90 TABLET | Refills: 1 | Status: SHIPPED | OUTPATIENT
Start: 2023-08-18

## 2023-08-18 RX ORDER — FLUTICASONE PROPIONATE 50 MCG
2 SPRAY, SUSPENSION (ML) NASAL DAILY
Qty: 48 G | Refills: 1 | Status: SHIPPED | OUTPATIENT
Start: 2023-08-18

## 2023-08-18 RX ORDER — FEXOFENADINE HCL 180 MG/1
180 TABLET ORAL DAILY
Qty: 90 TABLET | Refills: 1 | Status: SHIPPED | OUTPATIENT
Start: 2023-08-18

## 2023-08-18 RX ORDER — METOPROLOL SUCCINATE 25 MG/1
25 TABLET, EXTENDED RELEASE ORAL DAILY
Qty: 90 TABLET | Refills: 1 | Status: SHIPPED | OUTPATIENT
Start: 2023-08-18

## 2023-08-18 ASSESSMENT — PATIENT HEALTH QUESTIONNAIRE - PHQ9
SUM OF ALL RESPONSES TO PHQ QUESTIONS 1-9: 0
SUM OF ALL RESPONSES TO PHQ9 QUESTIONS 1 & 2: 0
1. LITTLE INTEREST OR PLEASURE IN DOING THINGS: 0
2. FEELING DOWN, DEPRESSED OR HOPELESS: 0
SUM OF ALL RESPONSES TO PHQ QUESTIONS 1-9: 0

## 2023-08-18 NOTE — PROGRESS NOTES
Progress Note    she is a 61y.o. year old female who presents for evaluation of Follow-up, Hypertension, and Medication Refill (Pt wants refills on all of her medications.)        Assessment/ Plan:     1. Essential (primary) hypertension  At goal  Continue metoprolol succinate 25 mg daily, chlorthalidone 25 mg daily  Low-sodium diet recommended  Weight loss recommended  150 minutes of exercise weekly recommended  -     Comprehensive Metabolic Panel; Future  -     CBC; Future  -     TSH; Future  -     chlorthalidone (HYGROTON) 25 MG tablet; Take 1 tablet by mouth daily, Disp-90 tablet, R-1Normal  -     metoprolol succinate (TOPROL XL) 25 MG extended release tablet; Take 1 tablet by mouth daily, Disp-90 tablet, R-1Normal    2. Hyperlipidemia LDL goal <100  LDL at goal  Continue heart healthy diet low in saturated fat  Repeat fasting lipids in 6 to 12 months  See also therapeutic lifestyle changes recommended above in #1  -     Lipid Panel; Future  -     Comprehensive Metabolic Panel; Future    3. Other allergic rhinitis  Symptoms controlled  Continue antihistamine, steroid nasal spray at current doses  -     fexofenadine (ALLEGRA) 180 MG tablet; Take 1 tablet by mouth daily, Disp-90 tablet, R-1Normal  -     fluticasone (FLONASE) 50 MCG/ACT nasal spray; 2 sprays by Nasal route daily, Disp-48 g, R-1Normal        Patient is fasting for labs today. Return in about 6 months (around 2/18/2024), or if symptoms worsen or fail to improve, for blood pressure, cholesterol and fasting labs. I have discussed the diagnosis with the patient and the intended plan as seen in the above orders. The patient has received an after-visit summary and questions were answered concerning future plans. Pt conveyed understanding of plan.     Medication Side Effects and Warnings were discussed with patient        Subjective:     Chief Complaint   Patient presents with    Follow-up    Hypertension    Medication Refill     Pt wants

## 2023-08-19 LAB
ALBUMIN SERPL-MCNC: 4.1 G/DL (ref 3.8–4.9)
ALBUMIN/GLOB SERPL: 1.8 {RATIO} (ref 1.2–2.2)
ALP SERPL-CCNC: 48 IU/L (ref 44–121)
ALT SERPL-CCNC: 18 IU/L (ref 0–32)
AST SERPL-CCNC: 16 IU/L (ref 0–40)
BILIRUB SERPL-MCNC: 0.7 MG/DL (ref 0–1.2)
BUN SERPL-MCNC: 12 MG/DL (ref 6–24)
BUN/CREAT SERPL: 17 (ref 9–23)
CALCIUM SERPL-MCNC: 8.8 MG/DL (ref 8.7–10.2)
CHLORIDE SERPL-SCNC: 101 MMOL/L (ref 96–106)
CHOLEST SERPL-MCNC: 160 MG/DL (ref 100–199)
CO2 SERPL-SCNC: 28 MMOL/L (ref 20–29)
CREAT SERPL-MCNC: 0.72 MG/DL (ref 0.57–1)
EGFRCR SERPLBLD CKD-EPI 2021: 97 ML/MIN/1.73
ERYTHROCYTE [DISTWIDTH] IN BLOOD BY AUTOMATED COUNT: 13.7 % (ref 11.7–15.4)
GLOBULIN SER CALC-MCNC: 2.3 G/DL (ref 1.5–4.5)
GLUCOSE SERPL-MCNC: 82 MG/DL (ref 70–99)
HCT VFR BLD AUTO: 36.5 % (ref 34–46.6)
HDLC SERPL-MCNC: 53 MG/DL
HGB BLD-MCNC: 11.9 G/DL (ref 11.1–15.9)
IMP & REVIEW OF LAB RESULTS: NORMAL
LDLC SERPL CALC-MCNC: 96 MG/DL (ref 0–99)
MCH RBC QN AUTO: 27.4 PG (ref 26.6–33)
MCHC RBC AUTO-ENTMCNC: 32.6 G/DL (ref 31.5–35.7)
MCV RBC AUTO: 84 FL (ref 79–97)
PLATELET # BLD AUTO: 179 X10E3/UL (ref 150–450)
POTASSIUM SERPL-SCNC: 3.2 MMOL/L (ref 3.5–5.2)
PROT SERPL-MCNC: 6.4 G/DL (ref 6–8.5)
RBC # BLD AUTO: 4.34 X10E6/UL (ref 3.77–5.28)
SODIUM SERPL-SCNC: 141 MMOL/L (ref 134–144)
TRIGL SERPL-MCNC: 56 MG/DL (ref 0–149)
TSH SERPL DL<=0.005 MIU/L-ACNC: 0.68 UIU/ML (ref 0.45–4.5)
VLDLC SERPL CALC-MCNC: 11 MG/DL (ref 5–40)
WBC # BLD AUTO: 5.3 X10E3/UL (ref 3.4–10.8)

## 2023-08-25 ENCOUNTER — OFFICE VISIT (OUTPATIENT)
Age: 58
End: 2023-08-25
Payer: COMMERCIAL

## 2023-08-25 VITALS
BODY MASS INDEX: 27.94 KG/M2 | HEIGHT: 67 IN | SYSTOLIC BLOOD PRESSURE: 113 MMHG | HEART RATE: 60 BPM | TEMPERATURE: 97.8 F | DIASTOLIC BLOOD PRESSURE: 74 MMHG | OXYGEN SATURATION: 99 % | WEIGHT: 178 LBS | RESPIRATION RATE: 18 BRPM

## 2023-08-25 DIAGNOSIS — J34.89 SINUS PRESSURE: ICD-10-CM

## 2023-08-25 DIAGNOSIS — R09.81 NASAL CONGESTION: Primary | ICD-10-CM

## 2023-08-25 PROCEDURE — 99213 OFFICE O/P EST LOW 20 MIN: CPT | Performed by: PHYSICIAN ASSISTANT

## 2023-08-25 PROCEDURE — 3078F DIAST BP <80 MM HG: CPT | Performed by: PHYSICIAN ASSISTANT

## 2023-08-25 PROCEDURE — 3074F SYST BP LT 130 MM HG: CPT | Performed by: PHYSICIAN ASSISTANT

## 2023-08-25 RX ORDER — AZITHROMYCIN 250 MG/1
250 TABLET, FILM COATED ORAL SEE ADMIN INSTRUCTIONS
Qty: 6 TABLET | Refills: 0 | Status: SHIPPED | OUTPATIENT
Start: 2023-08-25 | End: 2023-08-30

## 2023-08-25 SDOH — ECONOMIC STABILITY: HOUSING INSECURITY
IN THE LAST 12 MONTHS, WAS THERE A TIME WHEN YOU DID NOT HAVE A STEADY PLACE TO SLEEP OR SLEPT IN A SHELTER (INCLUDING NOW)?: NO

## 2023-08-25 SDOH — ECONOMIC STABILITY: FOOD INSECURITY: WITHIN THE PAST 12 MONTHS, THE FOOD YOU BOUGHT JUST DIDN'T LAST AND YOU DIDN'T HAVE MONEY TO GET MORE.: NEVER TRUE

## 2023-08-25 SDOH — ECONOMIC STABILITY: FOOD INSECURITY: WITHIN THE PAST 12 MONTHS, YOU WORRIED THAT YOUR FOOD WOULD RUN OUT BEFORE YOU GOT MONEY TO BUY MORE.: NEVER TRUE

## 2023-08-25 SDOH — ECONOMIC STABILITY: INCOME INSECURITY: HOW HARD IS IT FOR YOU TO PAY FOR THE VERY BASICS LIKE FOOD, HOUSING, MEDICAL CARE, AND HEATING?: NOT VERY HARD

## 2023-08-25 NOTE — PROGRESS NOTES
Chief Complaint   Patient presents with    Sinus Problem       1. Patient in office today for sinus problems that began Wednesday. Reports nasal congestion and post nasal drip. Reports throat irritation. Reports secretions are yellow with blood specks. Have not treated with otc. Reports  was seen at urgent care this week, dx with covid. Pt took at home covid test yesterday was negative. 1. Have you been to the ER, urgent care clinic since your last visit? Hospitalized since your last visit? No    2. Have you seen or consulted any other health care providers outside of the [address] [place] [address] Avenue since your last visit? Include any pap smears or colon screening.  No
for 5 days 500mg on day 1 followed by 250mg on days 2 - 5    Approximate the patient is difficult to say as to whether or not she has COVID. The home COVID testing may not be as reliable since the virus has changed since the test was first created. I advised the patient since her symptoms are somewhat better today to see if her symptoms are continuing to improve. If so she does not need to start azithromycin. If the patient's symptoms does not seem to be improving she may start the antibiotic. Advised the patient that she should take an antihistamine if she is having postnasal drip and she should try Mucinex if she feels her mucus is thick. Encouraged the patient to make sure that she is drinking adequate amounts of fluid throughout the day. Patient is to follow-up if symptoms persist or worsen. Time was used for level of billing: no     No follow-up provider specified. I have discussed the diagnosis with the patient and the intended plan as seen in the above orders. The patient has received an after-visit summary and questions were answered concerning future plans.      Medication Side Effects and Warnings were discussed with patient: Yes  Patient Labs were reviewed and or requested: No  Patient Past Records were reviewed and or requested  Yes    Ban Manzano PA-C

## 2023-11-08 ENCOUNTER — TRANSCRIBE ORDERS (OUTPATIENT)
Facility: HOSPITAL | Age: 58
End: 2023-11-08

## 2023-11-08 DIAGNOSIS — Z12.31 VISIT FOR SCREENING MAMMOGRAM: Primary | ICD-10-CM

## 2023-12-27 ENCOUNTER — HOSPITAL ENCOUNTER (OUTPATIENT)
Facility: HOSPITAL | Age: 58
Discharge: HOME | End: 2023-12-30
Payer: COMMERCIAL

## 2023-12-27 VITALS — WEIGHT: 178 LBS | HEIGHT: 67 IN | BODY MASS INDEX: 27.94 KG/M2

## 2023-12-27 DIAGNOSIS — Z12.31 VISIT FOR SCREENING MAMMOGRAM: ICD-10-CM

## 2023-12-27 PROCEDURE — 77063 BREAST TOMOSYNTHESIS BI: CPT

## 2024-03-28 DIAGNOSIS — I10 ESSENTIAL (PRIMARY) HYPERTENSION: ICD-10-CM

## 2024-04-01 RX ORDER — CHLORTHALIDONE 25 MG/1
25 TABLET ORAL DAILY
Qty: 90 TABLET | Refills: 0 | Status: SHIPPED | OUTPATIENT
Start: 2024-04-01

## 2024-04-01 RX ORDER — METOPROLOL SUCCINATE 25 MG/1
25 TABLET, EXTENDED RELEASE ORAL DAILY
Qty: 90 TABLET | Refills: 0 | Status: SHIPPED | OUTPATIENT
Start: 2024-04-01

## 2024-06-17 ENCOUNTER — OFFICE VISIT (OUTPATIENT)
Age: 59
End: 2024-06-17
Payer: COMMERCIAL

## 2024-06-17 VITALS
OXYGEN SATURATION: 99 % | HEIGHT: 67 IN | DIASTOLIC BLOOD PRESSURE: 77 MMHG | BODY MASS INDEX: 29.54 KG/M2 | HEART RATE: 55 BPM | WEIGHT: 188.2 LBS | TEMPERATURE: 97.7 F | SYSTOLIC BLOOD PRESSURE: 114 MMHG

## 2024-06-17 DIAGNOSIS — D22.9 NEVUS: ICD-10-CM

## 2024-06-17 DIAGNOSIS — I10 ESSENTIAL (PRIMARY) HYPERTENSION: ICD-10-CM

## 2024-06-17 DIAGNOSIS — R82.90 ABNORMAL URINE ODOR: ICD-10-CM

## 2024-06-17 DIAGNOSIS — J30.89 OTHER ALLERGIC RHINITIS: ICD-10-CM

## 2024-06-17 LAB
BILIRUBIN, URINE, POC: NEGATIVE
BLOOD URINE, POC: NEGATIVE
GLUCOSE URINE, POC: NEGATIVE
LEUKOCYTE ESTERASE, URINE, POC: NEGATIVE
NITRITE, URINE, POC: NEGATIVE
PROTEIN,URINE, POC: NEGATIVE
SPECIFIC GRAVITY, URINE, POC: 1.01 (ref 1–1.03)
URINALYSIS CLARITY, POC: CLEAR
URINALYSIS COLOR, POC: YELLOW
UROBILINOGEN, POC: NORMAL

## 2024-06-17 PROCEDURE — 99214 OFFICE O/P EST MOD 30 MIN: CPT | Performed by: NURSE PRACTITIONER

## 2024-06-17 PROCEDURE — 3074F SYST BP LT 130 MM HG: CPT | Performed by: NURSE PRACTITIONER

## 2024-06-17 PROCEDURE — 81002 URINALYSIS NONAUTO W/O SCOPE: CPT | Performed by: NURSE PRACTITIONER

## 2024-06-17 PROCEDURE — 3078F DIAST BP <80 MM HG: CPT | Performed by: NURSE PRACTITIONER

## 2024-06-17 RX ORDER — FEXOFENADINE HCL 180 MG/1
180 TABLET ORAL DAILY
Qty: 90 TABLET | Refills: 1 | Status: SHIPPED | OUTPATIENT
Start: 2024-06-17

## 2024-06-17 RX ORDER — FLUTICASONE PROPIONATE 50 MCG
2 SPRAY, SUSPENSION (ML) NASAL DAILY
Qty: 48 G | Refills: 1 | Status: SHIPPED | OUTPATIENT
Start: 2024-06-17

## 2024-06-17 RX ORDER — METOPROLOL SUCCINATE 25 MG/1
25 TABLET, EXTENDED RELEASE ORAL DAILY
Qty: 90 TABLET | Refills: 0 | Status: SHIPPED | OUTPATIENT
Start: 2024-06-17

## 2024-06-17 RX ORDER — CHLORTHALIDONE 25 MG/1
25 TABLET ORAL DAILY
Qty: 90 TABLET | Refills: 0 | Status: SHIPPED | OUTPATIENT
Start: 2024-06-17

## 2024-06-17 ASSESSMENT — PATIENT HEALTH QUESTIONNAIRE - PHQ9
SUM OF ALL RESPONSES TO PHQ QUESTIONS 1-9: 0
SUM OF ALL RESPONSES TO PHQ9 QUESTIONS 1 & 2: 0
1. LITTLE INTEREST OR PLEASURE IN DOING THINGS: NOT AT ALL
SUM OF ALL RESPONSES TO PHQ QUESTIONS 1-9: 0
2. FEELING DOWN, DEPRESSED OR HOPELESS: NOT AT ALL
SUM OF ALL RESPONSES TO PHQ QUESTIONS 1-9: 0
SUM OF ALL RESPONSES TO PHQ QUESTIONS 1-9: 0

## 2024-06-17 ASSESSMENT — ENCOUNTER SYMPTOMS
ROS SKIN COMMENTS: CHANGE IN MOLE
GASTROINTESTINAL NEGATIVE: 1

## 2024-06-17 NOTE — PATIENT INSTRUCTIONS

## 2024-06-17 NOTE — PROGRESS NOTES
Amisha Marcos is a 58 y.o. female , id x 2(name and ). Reviewed record, history, and  medications.    Chief Complaint   Patient presents with    Medication Refill    Urinary Tract Infection      Having some intermittent odor        Vitals:    24 1015   BP: 114/77   Pulse: 55   Temp: 97.7 °F (36.5 °C)   SpO2: 99%   Weight: 85.4 kg (188 lb 3.2 oz)   Height: 1.702 m (5' 7\")       \"Have you been to the ER, urgent care clinic since your last visit?  Hospitalized since your last visit?\"    NO    “Have you seen or consulted any other health care providers outside of [facility] since your last visit?”    NO        2024    10:18 AM   PHQ-9    Little interest or pleasure in doing things 0   Feeling down, depressed, or hopeless 0   PHQ-2 Score 0   PHQ-9 Total Score 0          No data to display              Social Determinants of Health     Tobacco Use: Low Risk  (2024)    Patient History     Smoking Tobacco Use: Never     Smokeless Tobacco Use: Never     Passive Exposure: Not on file   Alcohol Use: Not on file   Financial Resource Strain: Low Risk  (2023)    Overall Financial Resource Strain (CARDIA)     Difficulty of Paying Living Expenses: Not very hard   Food Insecurity: Not on file (2023)   Transportation Needs: Unknown (2023)    PRAPARE - Transportation     Lack of Transportation (Medical): Not on file     Lack of Transportation (Non-Medical): No   Physical Activity: Not on file   Stress: Not on file   Social Connections: Not on file   Intimate Partner Violence: Not on file   Depression: Not at risk (2024)    PHQ-2     PHQ-2 Score: 0   Housing Stability: Unknown (2023)    Housing Stability Vital Sign     Unable to Pay for Housing in the Last Year: Not on file     Number of Places Lived in the Last Year: Not on file     Unstable Housing in the Last Year: No   Interpersonal Safety: Not on file   Utilities: Not on file       Click Here for Release of Records

## 2024-06-17 NOTE — PROGRESS NOTES
Progress Note        Medication refill  - she also c/o occasional odor to her urine x 1 month or so that occurs infrequently   - also has a mole on the right side of her face she'd like checked  Mole is raised    Medication Refill    Urinary Tract Infection  Pertinent negatives include no hematuria.        Subjective:     Patient's medications, allergies, past medical, surgical, social and family histories were reviewed and updated as appropriate.    Review of Systems   Constitutional: Negative.    Gastrointestinal: Negative.    Genitourinary:  Negative for decreased urine volume, dysuria, frequency, hematuria and urgency.        Odor to urine   Skin:         Change in mole   All other systems reviewed and are negative.       Objective:     Vitals:    06/17/24 1015   BP: 114/77   Pulse: 55   Temp: 97.7 °F (36.5 °C)   SpO2: 99%   Weight: 85.4 kg (188 lb 3.2 oz)   Height: 1.702 m (5' 7\")       Physical Exam  Vitals and nursing note reviewed.   Constitutional:       General: She is not in acute distress.     Appearance: Normal appearance. She is normal weight. She is not ill-appearing.   HENT:      Head: Normocephalic and atraumatic.      Nose: Nose normal.      Mouth/Throat:      Mouth: Mucous membranes are moist.      Pharynx: Oropharynx is clear.   Eyes:      Extraocular Movements: Extraocular movements intact.      Conjunctiva/sclera: Conjunctivae normal.   Cardiovascular:      Rate and Rhythm: Normal rate and regular rhythm.      Pulses: Normal pulses.      Heart sounds: Normal heart sounds.   Pulmonary:      Effort: Pulmonary effort is normal.      Breath sounds: Normal breath sounds.   Abdominal:      General: Abdomen is flat. Bowel sounds are normal. There is no distension.      Palpations: Abdomen is soft. There is no mass.      Tenderness: There is no abdominal tenderness. There is no right CVA tenderness, left CVA tenderness, guarding or rebound.      Hernia: No hernia is present.   Musculoskeletal:

## 2024-06-18 LAB
ALBUMIN SERPL-MCNC: 4.1 G/DL (ref 3.8–4.9)
ALP SERPL-CCNC: 51 IU/L (ref 44–121)
ALT SERPL-CCNC: 16 IU/L (ref 0–32)
AST SERPL-CCNC: 19 IU/L (ref 0–40)
BASOPHILS NFR BLD AUTO: 1 %
BILIRUB SERPL-MCNC: 0.3 MG/DL (ref 0–1.2)
BUN SERPL-MCNC: 16 MG/DL (ref 6–24)
BUN/CREAT SERPL: 17 (ref 9–23)
CALCIUM SERPL-MCNC: 9.3 MG/DL (ref 8.7–10.2)
CHLORIDE SERPL-SCNC: 102 MMOL/L (ref 96–106)
CHOLEST SERPL-MCNC: 185 MG/DL (ref 100–199)
CO2 SERPL-SCNC: 26 MMOL/L (ref 20–29)
CREAT SERPL-MCNC: 0.92 MG/DL (ref 0.57–1)
EGFRCR SERPLBLD CKD-EPI 2021: 72 ML/MIN/1.73
EOSINOPHIL # BLD AUTO: 0.1 X10E3/UL (ref 0–0.4)
ERYTHROCYTE [DISTWIDTH] IN BLOOD BY AUTOMATED COUNT: 13.9 % (ref 11.7–15.4)
GLOBULIN SER CALC-MCNC: 2.6 G/DL (ref 1.5–4.5)
GLUCOSE SERPL-MCNC: 86 MG/DL (ref 70–99)
HBA1C MFR BLD: 6 % (ref 4.8–5.6)
HDLC SERPL-MCNC: 63 MG/DL
HGB BLD-MCNC: 12.3 G/DL (ref 11.1–15.9)
IMM GRANULOCYTES # BLD AUTO: 0 X10E3/UL (ref 0–0.1)
IMM GRANULOCYTES NFR BLD AUTO: 0 %
IMP & REVIEW OF LAB RESULTS: NORMAL
LDLC SERPL CALC-MCNC: 111 MG/DL (ref 0–99)
LYMPHOCYTES # BLD AUTO: 2.4 X10E3/UL (ref 0.7–3.1)
LYMPHOCYTES NFR BLD AUTO: 39 %
MCH RBC QN AUTO: 26.9 PG (ref 26.6–33)
MCHC RBC AUTO-ENTMCNC: 32 G/DL (ref 31.5–35.7)
MONOCYTES # BLD AUTO: 0.3 X10E3/UL (ref 0.1–0.9)
NEUTROPHILS # BLD AUTO: 3.4 X10E3/UL (ref 1.4–7)
NEUTROPHILS NFR BLD AUTO: 54 %
PLATELET # BLD AUTO: 189 X10E3/UL (ref 150–450)
POTASSIUM SERPL-SCNC: 3.7 MMOL/L (ref 3.5–5.2)
PROT SERPL-MCNC: 6.7 G/DL (ref 6–8.5)
RBC # BLD AUTO: 4.57 X10E6/UL (ref 3.77–5.28)
SODIUM SERPL-SCNC: 142 MMOL/L (ref 134–144)
T4 FREE SERPL-MCNC: 1.12 NG/DL (ref 0.82–1.77)
TRIGL SERPL-MCNC: 55 MG/DL (ref 0–149)
TSH SERPL DL<=0.005 MIU/L-ACNC: 0.58 UIU/ML (ref 0.45–4.5)
VLDLC SERPL CALC-MCNC: 11 MG/DL (ref 5–40)

## 2024-07-05 DIAGNOSIS — I10 ESSENTIAL (PRIMARY) HYPERTENSION: ICD-10-CM

## 2024-07-05 RX ORDER — CHLORTHALIDONE 25 MG/1
25 TABLET ORAL DAILY
Qty: 30 TABLET | Refills: 0 | Status: SHIPPED | OUTPATIENT
Start: 2024-07-05

## 2024-07-12 DIAGNOSIS — I10 ESSENTIAL (PRIMARY) HYPERTENSION: ICD-10-CM

## 2024-07-12 RX ORDER — METOPROLOL SUCCINATE 25 MG/1
25 TABLET, EXTENDED RELEASE ORAL DAILY
Qty: 30 TABLET | Refills: 0 | Status: SHIPPED | OUTPATIENT
Start: 2024-07-12

## 2024-10-25 ENCOUNTER — TELEPHONE (OUTPATIENT)
Age: 59
End: 2024-10-25

## 2024-10-25 DIAGNOSIS — I10 ESSENTIAL (PRIMARY) HYPERTENSION: ICD-10-CM

## 2024-10-25 RX ORDER — METOPROLOL SUCCINATE 25 MG/1
25 TABLET, EXTENDED RELEASE ORAL DAILY
Qty: 90 TABLET | Refills: 0 | Status: SHIPPED | OUTPATIENT
Start: 2024-10-25

## 2024-10-25 NOTE — TELEPHONE ENCOUNTER
We received a fax refill request for Amisha Marcos.  Please escribe Metoprolol Succinate ER to their pharmacy.  The pharmacy is correct in the chart and they are requesting a 30 day supply.

## 2025-01-03 ENCOUNTER — TELEPHONE (OUTPATIENT)
Facility: CLINIC | Age: 60
End: 2025-01-03

## 2025-01-03 DIAGNOSIS — I10 ESSENTIAL (PRIMARY) HYPERTENSION: ICD-10-CM

## 2025-01-03 RX ORDER — METOPROLOL SUCCINATE 25 MG/1
25 TABLET, EXTENDED RELEASE ORAL DAILY
Qty: 90 TABLET | Refills: 0 | Status: SHIPPED | OUTPATIENT
Start: 2025-01-03

## 2025-01-03 RX ORDER — CHLORTHALIDONE 25 MG/1
25 TABLET ORAL DAILY
Qty: 90 TABLET | Refills: 0 | Status: SHIPPED | OUTPATIENT
Start: 2025-01-03

## 2025-01-03 NOTE — TELEPHONE ENCOUNTER
We received a fax refill request for Amisha P [facility].  Please escribe Metoprolol succinate ER to their pharmacy.  The pharmacy is correct in the chart and they are requesting a 90 day supply.      We received a fax refill request for Amisha P [facility].  Please escribe Chlorthalidone to their pharmacy.  The pharmacy is correct in the chart and they are requesting a 90 day supply.

## 2025-01-09 ENCOUNTER — TRANSCRIBE ORDERS (OUTPATIENT)
Facility: HOSPITAL | Age: 60
End: 2025-01-09

## 2025-01-09 DIAGNOSIS — Z12.31 OTHER SCREENING MAMMOGRAM: Primary | ICD-10-CM

## 2025-01-20 ENCOUNTER — HOSPITAL ENCOUNTER (OUTPATIENT)
Facility: HOSPITAL | Age: 60
Discharge: HOME | End: 2025-01-23
Payer: COMMERCIAL

## 2025-01-20 DIAGNOSIS — Z12.31 OTHER SCREENING MAMMOGRAM: ICD-10-CM

## 2025-01-20 PROCEDURE — 77063 BREAST TOMOSYNTHESIS BI: CPT

## 2025-04-09 ENCOUNTER — TELEPHONE (OUTPATIENT)
Facility: CLINIC | Age: 60
End: 2025-04-09

## 2025-04-09 DIAGNOSIS — I10 ESSENTIAL (PRIMARY) HYPERTENSION: ICD-10-CM

## 2025-04-09 RX ORDER — METOPROLOL SUCCINATE 25 MG/1
25 TABLET, EXTENDED RELEASE ORAL DAILY
Qty: 90 TABLET | Refills: 0 | Status: SHIPPED | OUTPATIENT
Start: 2025-04-09

## 2025-04-09 RX ORDER — CHLORTHALIDONE 25 MG/1
25 TABLET ORAL DAILY
Qty: 90 TABLET | Refills: 0 | Status: SHIPPED | OUTPATIENT
Start: 2025-04-09

## 2025-04-09 NOTE — TELEPHONE ENCOUNTER
We received a fax refill request for Amisha Marcos.  Please escribe metoprolol succinate (TOPROL XL) 25 MG extended release tablet  to their pharmacy.  The pharmacy is correct in the chart and they are requesting a 90 day supply.

## 2025-04-09 NOTE — TELEPHONE ENCOUNTER
We received a fax refill request for Amisha Marcos.  Please escribe chlorthalidone (HYGROTON) 25 MG tablet  to their pharmacy.  The pharmacy is correct in the chart and they are requesting a 90 day supply.

## 2025-06-27 ENCOUNTER — TELEPHONE (OUTPATIENT)
Facility: CLINIC | Age: 60
End: 2025-06-27

## 2025-06-27 DIAGNOSIS — I10 ESSENTIAL (PRIMARY) HYPERTENSION: ICD-10-CM

## 2025-06-27 RX ORDER — CHLORTHALIDONE 25 MG/1
25 TABLET ORAL DAILY
Qty: 30 TABLET | Refills: 0 | Status: SHIPPED | OUTPATIENT
Start: 2025-06-27

## 2025-06-27 RX ORDER — METOPROLOL SUCCINATE 25 MG/1
25 TABLET, EXTENDED RELEASE ORAL DAILY
Qty: 30 TABLET | Refills: 0 | Status: SHIPPED | OUTPATIENT
Start: 2025-06-27

## 2025-07-24 DIAGNOSIS — I10 ESSENTIAL (PRIMARY) HYPERTENSION: ICD-10-CM

## 2025-07-24 RX ORDER — CHLORTHALIDONE 25 MG/1
25 TABLET ORAL DAILY
Qty: 30 TABLET | Refills: 1 | Status: SHIPPED | OUTPATIENT
Start: 2025-07-24

## 2025-07-24 RX ORDER — METOPROLOL SUCCINATE 25 MG/1
25 TABLET, EXTENDED RELEASE ORAL DAILY
Qty: 30 TABLET | Refills: 1 | Status: SHIPPED | OUTPATIENT
Start: 2025-07-24